# Patient Record
Sex: FEMALE | Race: WHITE | Employment: FULL TIME | URBAN - METROPOLITAN AREA
[De-identification: names, ages, dates, MRNs, and addresses within clinical notes are randomized per-mention and may not be internally consistent; named-entity substitution may affect disease eponyms.]

---

## 2018-05-24 ENCOUNTER — OFFICE VISIT (OUTPATIENT)
Dept: INTERNAL MEDICINE CLINIC | Age: 48
End: 2018-05-24

## 2018-05-24 VITALS
DIASTOLIC BLOOD PRESSURE: 84 MMHG | RESPIRATION RATE: 18 BRPM | OXYGEN SATURATION: 99 % | SYSTOLIC BLOOD PRESSURE: 125 MMHG | BODY MASS INDEX: 49.24 KG/M2 | WEIGHT: 260.8 LBS | HEART RATE: 58 BPM | TEMPERATURE: 97.8 F | HEIGHT: 61 IN

## 2018-05-24 DIAGNOSIS — Z76.89 ESTABLISHING CARE WITH NEW DOCTOR, ENCOUNTER FOR: ICD-10-CM

## 2018-05-24 DIAGNOSIS — Z12.39 SCREENING FOR MALIGNANT NEOPLASM OF BREAST: ICD-10-CM

## 2018-05-24 DIAGNOSIS — E66.01 MORBID OBESITY (HCC): ICD-10-CM

## 2018-05-24 DIAGNOSIS — N92.6 IRREGULAR PERIODS: Primary | ICD-10-CM

## 2018-05-24 NOTE — MR AVS SNAPSHOT
[address] [facility] 
[phone] Patient: Janelle Staples MRN: [id number] SHAGGY:[id number] Visit Information Date & Time Provider Department Dept. Phone Encounter #  
 5/24/2018 10:00 AM Junaid [facility] [address] [phone] [id number] Follow-up Instructions Return in about 3 months (around 8/24/2018) for pap smear . Upcoming Health Maintenance Date Due DTaP/Tdap/Td series (1 - Tdap) 9/8/1990 PAP AKA CERVICAL CYTOLOGY 9/8/1990 Influenza Age 5 to Adult 8/1/2018 Allergies as of 5/24/2018  Review Complete On: 5/24/2018 By: MD Junaid  
 Not on File Current Immunizations  Never Reviewed No immunizations on file. Not reviewed this visit You Were Diagnosed With   
  
 Codes Comments Screening for malignant neoplasm of breast    -  Primary ICD-10-CM: Z12.31 
ICD-9-CM: V76.10 Morbid obesity ([facility] [address]     ICD-10-CM: E66.01 
ICD-9-CM: 278.01 Irregular periods     ICD-10-CM: N92.6 ICD-9-CM: 626.4 Vitals BP Pulse Temp Resp Height(growth percentile) Weight(growth percentile) 125/84 (BP 1 Location: Right arm, BP Patient Position: Sitting) (!) 58 97.8 °F (36.6 °C) (Oral) 18 5' 1\" (1.549 m) 260 lb 12.8 oz (118.3 kg) SpO2 BMI OB Status Smoking Status 99% 49.28 kg/m2 Unknown Never Smoker Vitals History BMI and BSA Data Body Mass Index Body Surface Area  
 49.28 kg/m 2 2.26 m 2 Preferred Pharmacy Pharmacy Name Phone [address]  [place] [phone] Your Updated Medication List  
  
Notice  As of 5/24/2018 10:44 AM  
 You have not been prescribed any medications. We Performed the Following CBC WITH AUTOMATED DIFF [80369 CPT(R)] [facility] AND LH [78877 CPT(R)] HEMOGLOBIN A1C WITH EAG [12829 CPT(R)] LIPID PANEL [82161 CPT(R)] METABOLIC PANEL, COMPREHENSIVE [26689 CPT(R)] Follow-up Instructions Return in about 3 months (around 8/24/2018) for pap smear . To-Do List   
 05/24/2018 Imaging:  LISETH MAMMO BI SCREENING INCL CAD Patient Instructions Schedule pap smear We are checking labs Keep up the good work with diet and exercise Introducing [facility]! [facility] introduces [employer] patient portal. Now you can access parts of your medical record, email your doctor's office, and request medication refills online. 1. In your internet browser, go to [email or web address] 2. Click on the First Time User? Click Here link in the Sign In box. You will see the New Member Sign Up page. 3. Enter your Douban Access Code exactly as it appears below. You will not need to use this code after youve completed the sign-up process. If you do not sign up before the expiration date, you must request a new code. · [employer]ban Access Code: [email or web address] Expires: 8/22/2018 10:44 AM 
 
4. Enter the last four digits of your Social Security Number (xxxx) and Date of Birth (mm/dd/yyyy) as indicated and click Submit. You will be taken to the next sign-up page. 5. Create a Douban ID. This will be your Douban login ID and cannot be changed, so think of one that is secure and easy to remember. 6. Create a [employer]ban password. You can change your password at any time. 7. Enter your Password Reset Question and Answer. This can be used at a later time if you forget your password. 8. Enter your e-mail address. You will receive e-mail notification when new information is available in [address] 9. Click Sign Up. You can now view and download portions of your medical record. 10. Click the Download Summary menu link to download a portable copy of your medical information. If you have questions, please visit the Frequently Asked Questions section of the [employer] website. Remember, [employer] is NOT to be used for urgent needs. For medical emergencies, dial 911. Now available from your iPhone and Android! Please provide this summary of care documentation to your next provider. Your primary care clinician is listed as VALERIY Yan. If you have any questions after today's visit, please call [phone].

## 2018-05-24 NOTE — PROGRESS NOTES
Ms. Gerson Baird is a new patient who is here to establish care. CC:  Establish Care and Menstrual Problem (stopped about a year ago)       HPI:  Presenting to establish care. In the last year patient has had 2 periods. Last menstrual cycle was in January 2018. Menarche 5   Both sister and mother had hysterectomy  Occasional hot flashes, not bothersome  Generally feels well   Last pap smear done cannot recall   Not sexually active     Morbid obesity: Lost 80 lbs on weight watchers      SH  Works from home  Denies ETOH, tobacco and drug use    Overdue for mammogram    Review of systems:  Constitutional: negative for fever, chills, weight loss, night sweats   Eyes : negative for vision changes, eye pain and discharge  Nose and Throat: negative for tinnitus, sore throat   Cardiovascular: negative for chest pain, palpitations and shortness of breath  Respiratory: negative for shortness of breath, cough and wheezing   Gastroinstestinal: negative for abdominal pain, nausea, vomiting, diarrhea, constipation, and blood in the stool  Musculoskeletal: negative for back ache and joint ache   Genitourinary: negative for dysuria, nocturia, polyuria and hematuria   Neurologic: Negative for focal weakness, numbness or incoordination  Skin: negative for rash, pruritus  Hematologic: negative for easy bruising      History reviewed. No pertinent past medical history. Past Surgical History:   Procedure Laterality Date    HX OTHER SURGICAL      right foot surgery       Not on File    No current outpatient prescriptions on file prior to visit. No current facility-administered medications on file prior to visit. family history includes Alzheimer in her paternal grandmother; Cancer (age of onset: 62) in her father. Social History     Social History    Marital status: SINGLE     Spouse name: N/A    Number of children: N/A    Years of education: N/A     Occupational History    Not on file.      Social History Main Topics    Smoking status: Never Smoker    Smokeless tobacco: Never Used    Alcohol use Yes    Drug use: No    Sexual activity: Not Currently     Other Topics Concern    Not on file     Social History Narrative    No narrative on file       Visit Vitals    /84 (BP 1 Location: Right arm, BP Patient Position: Sitting)    Pulse (!) 58    Temp 97.8 °F (36.6 °C) (Oral)    Resp 18    Ht 5' 1\" (1.549 m)    Wt 260 lb 12.8 oz (118.3 kg)    SpO2 99%    BMI 49.28 kg/m2     General:  Well appearing female no acute distress/ morbid obesity  HEENT:   PERRL,normal conjunctiva. External ear and canals normal, TMs normal.  Hearing normal to voice. Nose without edema or discharge, normal septum. Lips, teeth, gums normal.  Oropharynx: no erythema, no exudates, no lesions, normal tongue. Neck:  Supple. Thyroid normal size, nontender, without nodules. No carotid bruit. No masses or lymphadenopathy  Respiratory: no respiratory distress,  no wheezing, no rhonchi, no rales. No chest wall tenderness. Cardiovascular:  RRR, normal S1S2, no murmur. Gastrointestinal: normal bowel sounds, soft, nontender, without masses. No hepatosplenomegaly. Extremities +2 pulses, no edema, normal sensation   Musculoskeletal:  Normal gait. Normal digits and nails. Normal strength and tone, no atrophy, and no abnormal movement. Skin:  No rash, no lesions, no ulcers. Skin warm, normal turgor, without induration or nodules. Neuro:  A and OX4, fluent speech, cranial nerves normal 2-12. Sensation normal to light touch. DTR symmetrical  Psych:  Normal affect                Assessment and Plan:   48 yo presenting to establish care  1. Screening for malignant neoplasm of breast  - LISETH MAMMOGRAM SCREENING DIGITAL BILAT; Future    2. Morbid obesity ([address]  Patient lost 80 lbs on weight watchers, congratulated and encouraged patient. She is also exercising daily.  We will check cholesterol and test for diabetes   - METABOLIC PANEL, COMPREHENSIVE  - CBC WITH AUTOMATED DIFF  - HEMOGLOBIN A1C WITH EAG  - LIPID PANEL    3. Irregular periods  Having 2 periods per year will check [address] and LH suspect approaching menopause  - [address] AND LH  - schedule pap smear      Follow-up Disposition:  Return in about 3 months (around 8/24/2018) for pap smear .      Tracey Duarte MD

## 2018-05-24 NOTE — PROGRESS NOTES
Reviewed record in preparation for visit and have obtained necessary documentation. Identified pt with two pt identifiers(name and ). Chief Complaint   Patient presents with    Establish Care    Menstrual Problem     stopped about a year ago       Health Maintenance Due   Topic Date Due    DTaP/Tdap/Td  (1 - Tdap) 1990    Cervical Cancer Screening  1990       Ms. Beaver has a reminder for a \"due or due soon\" health maintenance. I have asked that she discuss this further with her primary care provider for follow-up on this health maintenance. Coordination of Care Questionnaire:  :     1) Have you been to an emergency room, urgent care clinic since your last visit? no   Hospitalized since your last visit? no             2) Have you seen or consulted any other health care providers outside of [address] [place] [address] since your last visit? no  (Include any pap smears or colon screenings in this section.)    3) In the event something were to happen to you and you were unable to speak on your behalf, do you have an Advance Directive/ Living Will in place stating your wishes? NO    Do you have an Advance Directive on file? no    4) Are you interested in receiving information on Advance Directives? NO    Patient is accompanied by self I have received verbal consent from [address] to discuss any/all medical information while they are present in the room.

## 2018-05-25 LAB
ALBUMIN SERPL-MCNC: 4.3 G/DL (ref 3.5–5.5)
ALBUMIN/GLOB SERPL: 1.7 {RATIO} (ref 1.2–2.2)
ALP SERPL-CCNC: 99 IU/L (ref 39–117)
ALT SERPL-CCNC: 26 IU/L (ref 0–32)
AST SERPL-CCNC: 21 IU/L (ref 0–40)
BASOPHILS # BLD AUTO: 0 X10E3/UL (ref 0–0.2)
BASOPHILS NFR BLD AUTO: 0 %
BILIRUB SERPL-MCNC: 0.5 MG/DL (ref 0–1.2)
BUN SERPL-MCNC: 10 MG/DL (ref 6–24)
BUN/CREAT SERPL: 19 (ref 9–23)
CALCIUM SERPL-MCNC: 9.7 MG/DL (ref 8.7–10.2)
CHLORIDE SERPL-SCNC: 103 MMOL/L (ref 96–106)
CHOLEST SERPL-MCNC: 183 MG/DL (ref 100–199)
CO2 SERPL-SCNC: 27 MMOL/L (ref 18–29)
CREAT SERPL-MCNC: 0.54 MG/DL (ref 0.57–1)
EOSINOPHIL # BLD AUTO: 0.2 X10E3/UL (ref 0–0.4)
EOSINOPHIL NFR BLD AUTO: 3 %
ERYTHROCYTE [DISTWIDTH] IN BLOOD BY AUTOMATED COUNT: 13.3 % (ref 12.3–15.4)
EST. AVERAGE GLUCOSE BLD GHB EST-MCNC: 100 MG/DL
FSH SERPL-ACNC: 66.4 MIU/ML
GFR SERPLBLD CREATININE-BSD FMLA CKD-EPI: 112 ML/MIN/1.73
GFR SERPLBLD CREATININE-BSD FMLA CKD-EPI: 129 ML/MIN/1.73
GLOBULIN SER CALC-MCNC: 2.5 G/DL (ref 1.5–4.5)
GLUCOSE SERPL-MCNC: 87 MG/DL (ref 65–99)
HBA1C MFR BLD: 5.1 % (ref 4.8–5.6)
HCT VFR BLD AUTO: 41.2 % (ref 34–46.6)
HDLC SERPL-MCNC: 49 MG/DL
HGB BLD-MCNC: 13.4 G/DL (ref 11.1–15.9)
IMM GRANULOCYTES # BLD: 0 X10E3/UL (ref 0–0.1)
IMM GRANULOCYTES NFR BLD: 0 %
LDLC SERPL CALC-MCNC: 119 MG/DL (ref 0–99)
LH SERPL-ACNC: 45.4 MIU/ML
LYMPHOCYTES # BLD AUTO: 1.8 X10E3/UL (ref 0.7–3.1)
LYMPHOCYTES NFR BLD AUTO: 31 %
MCH RBC QN AUTO: 28.8 PG (ref 26.6–33)
MCHC RBC AUTO-ENTMCNC: 32.5 G/DL (ref 31.5–35.7)
MCV RBC AUTO: 89 FL (ref 79–97)
MONOCYTES # BLD AUTO: 0.5 X10E3/UL (ref 0.1–0.9)
MONOCYTES NFR BLD AUTO: 8 %
NEUTROPHILS # BLD AUTO: 3.4 X10E3/UL (ref 1.4–7)
NEUTROPHILS NFR BLD AUTO: 58 %
PLATELET # BLD AUTO: 191 X10E3/UL (ref 150–379)
POTASSIUM SERPL-SCNC: 5.3 MMOL/L (ref 3.5–5.2)
PROT SERPL-MCNC: 6.8 G/DL (ref 6–8.5)
RBC # BLD AUTO: 4.65 X10E6/UL (ref 3.77–5.28)
SODIUM SERPL-SCNC: 144 MMOL/L (ref 134–144)
TRIGL SERPL-MCNC: 75 MG/DL (ref 0–149)
VLDLC SERPL CALC-MCNC: 15 MG/DL (ref 5–40)
WBC # BLD AUTO: 5.9 X10E3/UL (ref 3.4–10.8)

## 2018-06-05 NOTE — PROGRESS NOTES
Please let patient know that her hormone levels are indicative of approaching menopause. She should schedule an appointment for a Pap smear    Testing for diabetes was negative. Blood count is normal  Cholesterol: Triglycerides are normal ( short term fat storage), HDL good cholesterol is at goal,  LDL which is bad cholesterol is mildly elevated. Recommend  exercise to 30 minutes daily and increased fiber intake - vegetables, fruits and oats and whole grain. Decreasing fatty food intake. We will repeat cholesterol level in one year.

## 2018-07-10 ENCOUNTER — HOSPITAL ENCOUNTER (OUTPATIENT)
Dept: MAMMOGRAPHY | Age: 48
Discharge: HOME | End: 2018-07-10
Attending: INTERNAL MEDICINE
Payer: COMMERCIAL

## 2018-07-10 DIAGNOSIS — Z12.39 SCREENING FOR MALIGNANT NEOPLASM OF BREAST: ICD-10-CM

## 2018-07-10 PROCEDURE — 77067 SCR MAMMO BI INCL CAD: CPT

## 2018-10-23 ENCOUNTER — OFFICE VISIT (OUTPATIENT)
Dept: INTERNAL MEDICINE CLINIC | Age: 48
End: 2018-10-23

## 2018-10-23 VITALS
OXYGEN SATURATION: 99 % | SYSTOLIC BLOOD PRESSURE: 135 MMHG | HEART RATE: 55 BPM | TEMPERATURE: 97.8 F | BODY MASS INDEX: 44.37 KG/M2 | HEIGHT: 61 IN | RESPIRATION RATE: 18 BRPM | WEIGHT: 235 LBS | DIASTOLIC BLOOD PRESSURE: 88 MMHG

## 2018-10-23 DIAGNOSIS — J06.9 VIRAL UPPER RESPIRATORY TRACT INFECTION: Primary | ICD-10-CM

## 2018-10-23 NOTE — PATIENT INSTRUCTIONS
Viral Respiratory Infection: Care Instructions  Your Care Instructions    Viruses are very small organisms. They grow in number after they enter your body. There are many types that cause different illnesses, such as colds and the mumps. The symptoms of a viral respiratory infection often start quickly. They include a fever, sore throat, and runny nose. You may also just not feel well. Or you may not want to eat much. Most viral respiratory infections are not serious. They usually get better with time and self-care. Antibiotics are not used to treat a viral infection. That's because antibiotics will not help cure a viral illness. In some cases, antiviral medicine can help your body fight a serious viral infection. Follow-up care is a key part of your treatment and safety. Be sure to make and go to all appointments, and call your doctor if you are having problems. It's also a good idea to know your test results and keep a list of the medicines you take. How can you care for yourself at home? · Rest as much as possible until you feel better. · Be safe with medicines. Take your medicine exactly as prescribed. Call your doctor if you think you are having a problem with your medicine. You will get more details on the specific medicine your doctor prescribes. · Take an over-the-counter pain medicine, such as acetaminophen (Tylenol), ibuprofen (Advil, Motrin), or naproxen (Aleve), as needed for pain and fever. Read and follow all instructions on the label. Do not give aspirin to anyone younger than 20. It has been linked to Reye syndrome, a serious illness. · Drink plenty of fluids, enough so that your urine is light yellow or clear like water. Hot fluids, such as tea or soup, may help relieve congestion in your nose and throat. If you have kidney, heart, or liver disease and have to limit fluids, talk with your doctor before you increase the amount of fluids you drink.   · Try to clear mucus from your lungs by breathing deeply and coughing. · Gargle with warm salt water once an hour. This can help reduce swelling and throat pain. Use 1 teaspoon of salt mixed in 1 cup of warm water. · Do not smoke or allow others to smoke around you. If you need help quitting, talk to your doctor about stop-smoking programs and medicines. These can increase your chances of quitting for good. To avoid spreading the virus  · Cough or sneeze into a tissue. Then throw the tissue away. · If you don't have a tissue, use your hand to cover your cough or sneeze. Then clean your hand. You can also cough into your sleeve. · Wash your hands often. Use soap and warm water. Wash for 15 to 20 seconds each time. · If you don't have soap and water near you, you can clean your hands with alcohol wipes or gel. When should you call for help? Call your doctor now or seek immediate medical care if:    · You have a new or higher fever.     · Your fever lasts more than 48 hours.     · You have trouble breathing.     · You have a fever with a stiff neck or a severe headache.     · You are sensitive to light.     · You feel very sleepy or confused.    Watch closely for changes in your health, and be sure to contact your doctor if:    · You do not get better as expected. Where can you learn more? Go to http://kassidy-millicent.info/. Enter C455 in the search box to learn more about \"Viral Respiratory Infection: Care Instructions. \"  Current as of: December 6, 2017  Content Version: 11.8  © 2088-4866 [employer]. Care instructions adapted under license by [employer] (which disclaims liability or warranty for this information). If you have questions about a medical condition or this instruction, always ask your healthcare professional. [address] any warranty or liability for your use of this information.

## 2018-10-23 NOTE — PROGRESS NOTES
Reviewed record in preparation for visit and have obtained necessary documentation. Identified pt with two pt identifiers(name and ). Chief Complaint   Patient presents with    Cold Symptoms       Health Maintenance Due   Topic Date Due    DTaP/Tdap/Td  (1 - Tdap) 1990    Cervical Cancer Screening  1990    Flu Vaccine  2018       Ms. Beaver has a reminder for a \"due or due soon\" health maintenance. I have asked that she discuss this further with her primary care provider for follow-up on this health maintenance. Coordination of Care Questionnaire:  :     1) Have you been to an emergency room, urgent care clinic since your last visit? no   Hospitalized since your last visit? no             2) Have you seen or consulted any other health care providers outside of [address] [place] [address] since your last visit? no  (Include any pap smears or colon screenings in this section.)    3) In the event something were to happen to you and you were unable to speak on your behalf, do you have an Advance Directive/ Living Will in place stating your wishes? NO    Do you have an Advance Directive on file? no    4) Are you interested in receiving information on Advance Directives? NO    Patient is accompanied by self I have received verbal consent from [address] to discuss any/all medical information while they are present in the room.

## 2018-10-23 NOTE — PROGRESS NOTES
CC:   Chief Complaint   Patient presents with    Cold Symptoms         HPI:    Michael Whitley is a 51 y.o. female who complains of URI symptoms for 6  days. The patient reports sore throat, nasal congestion, ear congestion and cough. Feels exhausted  Resting   Coughing frequently using cough drops  Feels like the chest is congested     Review of Systems    Constitutional: negative for fevers, chills, anorexia and weight loss  Eyes:   negative for visual disturbance and irritation  ENT:   negative for tinnitus, positive for sore throat and nasal congestion  Negative for ear pain   Respiratory:  Positive  for cough, negative for hemoptysis, dyspnea,wheezing  CV:   negative for chest pain, palpitations, lower extremity edema  GI:   negative for nausea, vomiting, diarrhea, abdominal pain,melena  Genitourinary: negative for frequency, dysuria and hematuria, vaginal discharge, lesions  Musculoskel: negative for myalgias, arthralgias, back pain, muscle weakness, joint pain  Neurological:  negative for headaches, dizziness, focal weakness, numbness  Psych:         : negative for depression, anxiety     Objective:     Visit Vitals  /88 (BP 1 Location: Right arm, BP Patient Position: Sitting)   Pulse (!) 55   Temp 97.8 °F (36.6 °C) (Oral)   Resp 18   Ht 5' 1\" (1.549 m)   Wt 235 lb (106.6 kg)   SpO2 99%   BMI 44.40 kg/m²     Gen: Mildly ill appearing female  HEENT:   PERRL,normal conjunctiva. External ear and canals normal, TMs no opacification or erythema,  Swollen nasal turbinates, no sinus pain on palpation,  OP no erythema, no exudates, MMM  Neck:  Supple. Thyroid normal size, nontender, without nodules. No masses or LAD  Resp:  No wheezing, no rhonchi, no rales. CV:  RRR, normal S1S2, no murmur. GI: soft, nontender, without masses. No hepatosplenomegaly. Extrem:  +2 pulses, no edema, warm distally  Skin:  No rash, no lesions, no ulcers. Skin warm, normal turgor, without induration or nodules.        Lab Results   Component Value Date/Time    WBC 5.9 05/24/2018 11:16 AM    HGB 13.4 05/24/2018 11:16 AM    HCT 41.2 05/24/2018 11:16 AM    PLATELET 062 72/76/3930 11:16 AM    MCV 89 05/24/2018 11:16 AM     Lab Results   Component Value Date/Time    Sodium 144 05/24/2018 11:16 AM    Potassium 5.3 (H) 05/24/2018 11:16 AM    Chloride 103 05/24/2018 11:16 AM    CO2 27 05/24/2018 11:16 AM    Glucose 87 05/24/2018 11:16 AM    BUN 10 05/24/2018 11:16 AM    Creatinine 0.54 (L) 05/24/2018 11:16 AM    BUN/Creatinine ratio 19 05/24/2018 11:16 AM    GFR est  05/24/2018 11:16 AM    GFR est non- 05/24/2018 11:16 AM    Calcium 9.7 05/24/2018 11:16 AM         Assessment/Plan:     1. Viral upper respiratory tract infection  Rest, drink fluids   Call if symptoms do not improve in the next 5 days   No role for antibiotics        Follow-up Disposition:  Return if symptoms worsen or fail to improve.     Art MD Sam

## 2019-05-30 ENCOUNTER — OFFICE VISIT (OUTPATIENT)
Dept: INTERNAL MEDICINE CLINIC | Age: 49
End: 2019-05-30

## 2019-05-30 VITALS
RESPIRATION RATE: 18 BRPM | DIASTOLIC BLOOD PRESSURE: 73 MMHG | SYSTOLIC BLOOD PRESSURE: 114 MMHG | OXYGEN SATURATION: 99 % | TEMPERATURE: 97.8 F | WEIGHT: 217 LBS | HEIGHT: 61 IN | BODY MASS INDEX: 40.97 KG/M2 | HEART RATE: 63 BPM

## 2019-05-30 DIAGNOSIS — E66.01 MORBID OBESITY (HCC): Primary | ICD-10-CM

## 2019-05-30 DIAGNOSIS — M17.10 ARTHRITIS OF KNEE: ICD-10-CM

## 2019-05-30 RX ORDER — PHENTERMINE HYDROCHLORIDE 15 MG/1
15 CAPSULE ORAL
Qty: 30 CAP | Refills: 0 | Status: SHIPPED | OUTPATIENT
Start: 2019-05-30 | End: 2019-07-02 | Stop reason: ALTCHOICE

## 2019-05-30 NOTE — PROGRESS NOTES
CC: Complete Physical      HPI:    She is a 51 y.o. female who presents for evaluation of weight loss  Patient has steadily loosing weight. Doing weight watchers. Started 340 lbs and currently at 217 lbs  Has not taken any medication for weight loss  Goal 150 lbs  Since February plateau and difficulty with weight loss  Working just as hard, Using the fitness pal, Measures carb to fat to protein     ROS:  Constitutional: negative for fevers, chills, anorexia and weight loss  Eyes:   negative for visual disturbance,  irritation  ENT:   negative for tinnitus,sore throat,nasal congestion,ear pain, sinus pain. Respiratory:  negative for cough, hemoptysis, dyspnea,wheezing  CV:   negative for chest pain, palpitations, lower extremity edema  GI:   negative for nausea, vomiting, diarrhea, abdominal pain,melena  Genitourinary: negative for frequency, dysuria, hematuria  Musculoskel:positive for knee pain billateral chronic, exacerbated by running  Neurological:  negative for headaches, dizziness, focal weakness, numbness  Psych:             Negative for depression and anxiety    History reviewed. No pertinent past medical history. No current outpatient medications on file prior to visit. No current facility-administered medications on file prior to visit.         Past Surgical History:   Procedure Laterality Date    HX OTHER SURGICAL      right foot surgery       Family History   Problem Relation Age of Onset    Cancer Father 62        agent orange    Alzheimer Paternal Grandmother      Reviewed and no changes     Social History     Socioeconomic History    Marital status: SINGLE     Spouse name: Not on file    Number of children: Not on file    Years of education: Not on file    Highest education level: Not on file   Occupational History    Not on file   Social Needs    Financial resource strain: Not on file    Food insecurity:     Worry: Not on file     Inability: Not on file    Transportation needs: Medical: Not on file     Non-medical: Not on file   Tobacco Use    Smoking status: Never Smoker    Smokeless tobacco: Never Used   Substance and Sexual Activity    Alcohol use:  Yes    Drug use: No    Sexual activity: Not Currently   Lifestyle    Physical activity:     Days per week: Not on file     Minutes per session: Not on file    Stress: Not on file   Relationships    Social connections:     Talks on phone: Not on file     Gets together: Not on file     Attends Church service: Not on file     Active member of club or organization: Not on file     Attends meetings of clubs or organizations: Not on file     Relationship status: Not on file    Intimate partner violence:     Fear of current or ex partner: Not on file     Emotionally abused: Not on file     Physically abused: Not on file     Forced sexual activity: Not on file   Other Topics Concern    Not on file   Social History Narrative    Not on file            Visit Vitals  /73 (BP 1 Location: Right arm, BP Patient Position: Sitting)   Pulse 63   Temp 97.8 °F (36.6 °C) (Oral)   Resp 18   Ht 5' 1\" (1.549 m)   Wt 217 lb (98.4 kg)   SpO2 99%   BMI 41.00 kg/m²       Physical Examination:   General - Well appearing female  HEENT - PERRL, TM no erythema/opacification, normal nasal turbinates, oropharynx no erythema or exudate, MMM  Neck - supple, no bruits, no TMG, no LAD  Pulm - clear to auscultation bilaterally  Cardio - RRR, normal S1 S2, no murmur gallops or rubs  Abd - soft, nontender, no masses, no HSM  Extrem - no edema, +2 distal pulses  Psych - normal affect, appropriate mood    Lab Results   Component Value Date/Time    WBC 5.9 05/24/2018 11:16 AM    HGB 13.4 05/24/2018 11:16 AM    HCT 41.2 05/24/2018 11:16 AM    PLATELET 099 69/58/2019 11:16 AM    MCV 89 05/24/2018 11:16 AM     Lab Results   Component Value Date/Time    Sodium 144 05/24/2018 11:16 AM    Potassium 5.3 (H) 05/24/2018 11:16 AM    Chloride 103 05/24/2018 11:16 AM    CO2 27 05/24/2018 11:16 AM    Glucose 87 05/24/2018 11:16 AM    BUN 10 05/24/2018 11:16 AM    Creatinine 0.54 (L) 05/24/2018 11:16 AM    BUN/Creatinine ratio 19 05/24/2018 11:16 AM    GFR est  05/24/2018 11:16 AM    GFR est non- 05/24/2018 11:16 AM    Calcium 9.7 05/24/2018 11:16 AM     Lab Results   Component Value Date/Time    Cholesterol, total 183 05/24/2018 11:16 AM    HDL Cholesterol 49 05/24/2018 11:16 AM    LDL, calculated 119 (H) 05/24/2018 11:16 AM    VLDL, calculated 15 05/24/2018 11:16 AM    Triglyceride 75 05/24/2018 11:16 AM     No results found for: TSH, TSH2, TSH3, TSHP, TSHEXT, TSHEXT  No results found for: PSA, Ethelda Peaks, [id number], [id number], PSALT  Lab Results   Component Value Date/Time    Hemoglobin A1c 5.1 05/24/2018 11:16 AM     No results found for: Sudie Can, VD3RIA    Lab Results   Component Value Date/Time    ALT (SGPT) 26 05/24/2018 11:16 AM    AST (SGOT) 21 05/24/2018 11:16 AM    Alk. phosphatase 99 05/24/2018 11:16 AM    Bilirubin, total 0.5 05/24/2018 11:16 AM           Assessment/Plan:    1. Morbid obesity ([address]- encouraged patient to continue with weight loss - she has lost 125 lbs in 2 years and now having difficulty loosing more weight. Discussed risks of phentermine including arrhythmia   - phentermine (ADIPEX_P) 15 mg capsule; Take 1 Cap by mouth every morning. Max Daily Amount: 15 mg. Dispense: 30 Cap; Refill: 0  - METABOLIC PANEL, COMPREHENSIVE  - CBC WITH AUTOMATED DIFF    2.  Arthritis of knee: continue with weight loss, avoid high impact exercise  - METABOLIC PANEL, COMPREHENSIVE  - CBC WITH AUTOMATED DIFF            Raquel Wynonia Spurling, MD

## 2019-05-30 NOTE — PROGRESS NOTES
Reviewed record in preparation for visit and have obtained necessary documentation. Identified pt with two pt identifiers(name and ). Chief Complaint   Patient presents with    Complete Physical       Health Maintenance Due   Topic Date Due    DTaP/Tdap/Td  (1 - Tdap) 1990    Pap Test  1990       Ms. Beaver has a reminder for a \"due or due soon\" health maintenance. I have asked that she discuss this further with her primary care provider for follow-up on this health maintenance. Coordination of Care Questionnaire:  :     1) Have you been to an emergency room, urgent care clinic since your last visit? no   Hospitalized since your last visit? no             2) Have you seen or consulted any other health care providers outside of [address] [place] [address] since your last visit? no  (Include any pap smears or colon screenings in this section.)    3) In the event something were to happen to you and you were unable to speak on your behalf, do you have an Advance Directive/ Living Will in place stating your wishes? NO    Do you have an Advance Directive on file? no    4) Are you interested in receiving information on Advance Directives? NO    Patient is accompanied by self I have received verbal consent from [address] to discuss any/all medical information while they are present in the room.

## 2019-05-31 LAB
ALBUMIN SERPL-MCNC: 4.4 G/DL (ref 3.5–5.5)
ALBUMIN/GLOB SERPL: 1.8 {RATIO} (ref 1.2–2.2)
ALP SERPL-CCNC: 79 IU/L (ref 39–117)
ALT SERPL-CCNC: 21 IU/L (ref 0–32)
AST SERPL-CCNC: 22 IU/L (ref 0–40)
BASOPHILS # BLD AUTO: 0 X10E3/UL (ref 0–0.2)
BASOPHILS NFR BLD AUTO: 0 %
BILIRUB SERPL-MCNC: 0.3 MG/DL (ref 0–1.2)
BUN SERPL-MCNC: 26 MG/DL (ref 6–24)
BUN/CREAT SERPL: 44 (ref 9–23)
CALCIUM SERPL-MCNC: 9.6 MG/DL (ref 8.7–10.2)
CHLORIDE SERPL-SCNC: 101 MMOL/L (ref 96–106)
CO2 SERPL-SCNC: 27 MMOL/L (ref 20–29)
CREAT SERPL-MCNC: 0.59 MG/DL (ref 0.57–1)
EOSINOPHIL # BLD AUTO: 0.1 X10E3/UL (ref 0–0.4)
EOSINOPHIL NFR BLD AUTO: 2 %
ERYTHROCYTE [DISTWIDTH] IN BLOOD BY AUTOMATED COUNT: 13.3 % (ref 12.3–15.4)
GLOBULIN SER CALC-MCNC: 2.5 G/DL (ref 1.5–4.5)
GLUCOSE SERPL-MCNC: 93 MG/DL (ref 65–99)
HCT VFR BLD AUTO: 39.3 % (ref 34–46.6)
HGB BLD-MCNC: 13.2 G/DL (ref 11.1–15.9)
IMM GRANULOCYTES # BLD AUTO: 0 X10E3/UL (ref 0–0.1)
IMM GRANULOCYTES NFR BLD AUTO: 0 %
LYMPHOCYTES # BLD AUTO: 2.2 X10E3/UL (ref 0.7–3.1)
LYMPHOCYTES NFR BLD AUTO: 34 %
MCH RBC QN AUTO: 29.5 PG (ref 26.6–33)
MCHC RBC AUTO-ENTMCNC: 33.6 G/DL (ref 31.5–35.7)
MCV RBC AUTO: 88 FL (ref 79–97)
MONOCYTES # BLD AUTO: 0.4 X10E3/UL (ref 0.1–0.9)
MONOCYTES NFR BLD AUTO: 7 %
NEUTROPHILS # BLD AUTO: 3.6 X10E3/UL (ref 1.4–7)
NEUTROPHILS NFR BLD AUTO: 57 %
PLATELET # BLD AUTO: 208 X10E3/UL (ref 150–450)
POTASSIUM SERPL-SCNC: 4.7 MMOL/L (ref 3.5–5.2)
PROT SERPL-MCNC: 6.9 G/DL (ref 6–8.5)
RBC # BLD AUTO: 4.48 X10E6/UL (ref 3.77–5.28)
SODIUM SERPL-SCNC: 141 MMOL/L (ref 134–144)
T4 FREE SERPL-MCNC: 1.3 NG/DL (ref 0.82–1.77)
TSH SERPL DL<=0.005 MIU/L-ACNC: 2.32 UIU/ML (ref 0.45–4.5)
WBC # BLD AUTO: 6.3 X10E3/UL (ref 3.4–10.8)

## 2019-07-02 ENCOUNTER — OFFICE VISIT (OUTPATIENT)
Dept: INTERNAL MEDICINE CLINIC | Age: 49
End: 2019-07-02

## 2019-07-02 ENCOUNTER — HOSPITAL ENCOUNTER (OUTPATIENT)
Dept: LAB | Age: 49
Discharge: HOME | End: 2019-07-02
Payer: COMMERCIAL

## 2019-07-02 ENCOUNTER — TELEPHONE (OUTPATIENT)
Dept: INTERNAL MEDICINE CLINIC | Age: 49
End: 2019-07-02

## 2019-07-02 VITALS
SYSTOLIC BLOOD PRESSURE: 130 MMHG | BODY MASS INDEX: 40.22 KG/M2 | HEART RATE: 51 BPM | OXYGEN SATURATION: 99 % | DIASTOLIC BLOOD PRESSURE: 78 MMHG | TEMPERATURE: 97.8 F | WEIGHT: 213 LBS | RESPIRATION RATE: 18 BRPM | HEIGHT: 61 IN

## 2019-07-02 DIAGNOSIS — E66.01 CLASS 3 SEVERE OBESITY DUE TO EXCESS CALORIES WITHOUT SERIOUS COMORBIDITY WITH BODY MASS INDEX (BMI) OF 40.0 TO 44.9 IN ADULT (HCC): ICD-10-CM

## 2019-07-02 DIAGNOSIS — Z12.31 BREAST CANCER SCREENING BY MAMMOGRAM: Primary | ICD-10-CM

## 2019-07-02 DIAGNOSIS — Z01.419 ENCOUNTER FOR WELL WOMAN EXAM: Primary | ICD-10-CM

## 2019-07-02 PROCEDURE — 87624 HPV HI-RISK TYP POOLED RSLT: CPT

## 2019-07-02 PROCEDURE — 88175 CYTOPATH C/V AUTO FLUID REDO: CPT

## 2019-07-02 NOTE — TELEPHONE ENCOUNTER
Spoke with patient. Two pt identifiers confirmed. Advised patient that we will order her mammogram and then scheduling will contact her to setup. Pt verbalized understanding of information discussed w/ no further questions at this time.

## 2019-07-02 NOTE — PROGRESS NOTES
CC: Weight Management and Well Woman      HPI:    She is a 51 y.o. female who presents for evaluation of womans exam    Recall may 30th patient was started on low dose phentermine 15mg but felt jittery and decided to stop medication. Lost 4 lbs since last visit down to 213     Cannot recall last pap smear  Not sexually active  No surgeries  No kids  No vaginal issues    Not having periods     ROS:  Constitutional: negative for fevers, chills, anorexia and weight loss  Eyes:   negative for visual disturbance,  irritation  ENT:   negative for tinnitus,sore throat,nasal congestion,ear pain, sinus pain. Respiratory:  negative for cough, hemoptysis, dyspnea,wheezing  CV:   negative for chest pain, palpitations, lower extremity edema  GI:   negative for nausea, vomiting, diarrhea, abdominal pain,melena  Genitourinary: negative for frequency, dysuria, hematuria  Musculoskel: negative for myalgias, arthralgias, back pain, muscle weakness, joint pain  Neurological:  negative for headaches, dizziness, focal weakness, numbness  Psych:             Negative for depression and anxiety    History reviewed. No pertinent past medical history. No current outpatient medications on file prior to visit. No current facility-administered medications on file prior to visit.         Past Surgical History:   Procedure Laterality Date    HX OTHER SURGICAL      right foot surgery       Family History   Problem Relation Age of Onset    Cancer Father 62        agent orange    Alzheimer Paternal Grandmother      Reviewed and no changes     Social History     Socioeconomic History    Marital status: SINGLE     Spouse name: Not on file    Number of children: Not on file    Years of education: Not on file    Highest education level: Not on file   Occupational History    Not on file   Social Needs    Financial resource strain: Not on file    Food insecurity:     Worry: Not on file     Inability: Not on file   R[place]r needs: Medical: Not on file     Non-medical: Not on file   Tobacco Use    Smoking status: Never Smoker    Smokeless tobacco: Never Used   Substance and Sexual Activity    Alcohol use: Yes    Drug use: No    Sexual activity: Not Currently   Lifestyle    Physical activity:     Days per week: Not on file     Minutes per session: Not on file    Stress: Not on file   Relationships    Social connections:     Talks on phone: Not on file     Gets together: Not on file     Attends Rastafarian service: Not on file     Active member of club or organization: Not on file     Attends meetings of clubs or organizations: Not on file     Relationship status: Not on file    Intimate partner violence:     Fear of current or ex partner: Not on file     Emotionally abused: Not on file     Physically abused: Not on file     Forced sexual activity: Not on file   Other Topics Concern    Not on file   Social History Narrative    Not on file            Visit Vitals  /78 (BP 1 Location: Right arm, BP Patient Position: Sitting)   Pulse (!) 51   Temp 97.8 °F (36.6 °C) (Oral)   Resp 18   Ht 5' 1\" (1.549 m)   Wt 213 lb (96.6 kg)   SpO2 99%   BMI 40.25 kg/m²       Physical Examination:   General - Well appearing female  HEENT - PERRL, TM no erythema/opacification, normal nasal turbinates, oropharynx no erythema or exudate, MMM  Neck - supple, no bruits, no TMG, no LAD  Pulm - clear to auscultation bilaterally  Cardio - RRR, normal S1 S2, no murmur gallops or rubs  Abd - soft, nontender, no masses, no HSM  Extrem - no edema, +2 distal pulses  Psych - normal affect, appropriate mood      Pelvic- normal appearing external genitalia, speculum normal appearing cervix, no abnormal discharge, pap done. Bimanual exam, no cervical motion tenderness, no adnexal tenderness or masses.       Lab Results   Component Value Date/Time    WBC 6.3 05/30/2019 04:13 PM    HGB 13.2 05/30/2019 04:13 PM    HCT 39.3 05/30/2019 04:13 PM    PLATELET 029 05/30/2019 04:13 PM    MCV 88 05/30/2019 04:13 PM     Lab Results   Component Value Date/Time    Sodium 141 05/30/2019 04:13 PM    Potassium 4.7 05/30/2019 04:13 PM    Chloride 101 05/30/2019 04:13 PM    CO2 27 05/30/2019 04:13 PM    Glucose 93 05/30/2019 04:13 PM    BUN 26 (H) 05/30/2019 04:13 PM    Creatinine 0.59 05/30/2019 04:13 PM    BUN/Creatinine ratio 44 (H) 05/30/2019 04:13 PM    GFR est  05/30/2019 04:13 PM    GFR est non- 05/30/2019 04:13 PM    Calcium 9.6 05/30/2019 04:13 PM     Lab Results   Component Value Date/Time    Cholesterol, total 183 05/24/2018 11:16 AM    HDL Cholesterol 49 05/24/2018 11:16 AM    LDL, calculated 119 (H) 05/24/2018 11:16 AM    VLDL, calculated 15 05/24/2018 11:16 AM    Triglyceride 75 05/24/2018 11:16 AM     Lab Results   Component Value Date/Time    TSH 2.320 05/30/2019 04:13 PM     No results found for: PSA, Yvonne Mends, [id number], [id number], PSALT  Lab Results   Component Value Date/Time    Hemoglobin A1c 5.1 05/24/2018 11:16 AM     No results found for: Ramy Archer, VD3RIA    Lab Results   Component Value Date/Time    ALT (SGPT) 21 05/30/2019 04:13 PM    AST (SGOT) 22 05/30/2019 04:13 PM    Alk. phosphatase 79 05/30/2019 04:13 PM    Bilirubin, total 0.3 05/30/2019 04:13 PM           Assessment/Plan:    1. Encounter for well woman exam normal exam  - PAP IG, APTIMA HPV AND RFX 16/18,45 ([id number])    2.  Obesity: did not tolerate phentermine, loosing weight, encouraged to keep up the good work        Rosalinda Benítez MD

## 2019-07-02 NOTE — PROGRESS NOTES
Reviewed record in preparation for visit and have obtained necessary documentation. Identified pt with two pt identifiers(name and ). Chief Complaint   Patient presents with    Weight Management       Health Maintenance Due   Topic Date Due    DTaP/Tdap/Td  (1 - Tdap) 1990    Pap Test  1990       Ms. Beaver has a reminder for a \"due or due soon\" health maintenance. I have asked that she discuss this further with her primary care provider for follow-up on this health maintenance. Coordination of Care Questionnaire:  :     1) Have you been to an emergency room, urgent care clinic since your last visit? no   Hospitalized since your last visit? no             2) Have you seen or consulted any other health care providers outside of [address] [place] [address] since your last visit? no  (Include any pap smears or colon screenings in this section.)    3) In the event something were to happen to you and you were unable to speak on your behalf, do you have an Advance Directive/ Living Will in place stating your wishes? NO    Do you have an Advance Directive on file? no    4) Are you interested in receiving information on Advance Directives? NO    Patient is accompanied by self I have received verbal consent from [address] to discuss any/all medical information while they are present in the room.

## 2019-07-29 ENCOUNTER — TELEPHONE (OUTPATIENT)
Dept: INTERNAL MEDICINE CLINIC | Age: 49
End: 2019-07-29

## 2019-07-29 NOTE — TELEPHONE ENCOUNTER
----- Message from Carlota Alexander sent at 7/29/2019 10:34 AM EDT -----  Regarding: Dr. Morris Forget: [phone]  Pt. stated she is having unexpected bleeding after intercourse. Pt would like to speak to the doctor about symptoms. Best contact number [phone].       Copy/paste envera

## 2019-07-29 NOTE — TELEPHONE ENCOUNTER
I recommend a pelvic exam followed by a pelvic US.  Vaginal bleeding post menopausal needs to be evaluated

## 2019-07-29 NOTE — TELEPHONE ENCOUNTER
Spoke with patient. Two pt identifiers confirmed. Patient states that she has not had her menstrual cycle in about a year and half. Patient states that she recently became involved with a new person and after intercourse patient states that she noticed that she was having some vaginal bleeding. Patient states that this lasted about 12 hours. Patient states that it was enough that she had to wear a panty liner. Patient states that she has been spotting since then. Patient denies being in pain. Patient denies experiencing vaginal dryness. Patient advised that I will check with Dr. Petar Rousseau to see what her thoughts are and will give her a call back as soon as I can. Pt verbalized understanding of information discussed w/ no further questions at this time.

## 2019-07-30 NOTE — TELEPHONE ENCOUNTER
Taina Charles MD 16 hours ago (4:32 PM)         I recommend a pelvic exam followed by a pelvic US. Vaginal bleeding post menopausal needs to be evaluated        Spoke with patient. Two pt identifiers confirmed. Patient advised of the above message from Dr. Fabian Proctor. Patient offered an appointment with Dr. Fabian Proctor on 08/01/19. Appointment accepted. Patient advised if anything changes or if unable to keep this appointment to call the office  Pt verbalized understanding of information discussed w/ no further questions at this time.

## 2019-08-01 ENCOUNTER — OFFICE VISIT (OUTPATIENT)
Dept: INTERNAL MEDICINE CLINIC | Age: 49
End: 2019-08-01

## 2019-08-01 VITALS
TEMPERATURE: 98.5 F | HEIGHT: 61 IN | DIASTOLIC BLOOD PRESSURE: 80 MMHG | WEIGHT: 212 LBS | RESPIRATION RATE: 16 BRPM | OXYGEN SATURATION: 97 % | BODY MASS INDEX: 40.02 KG/M2 | SYSTOLIC BLOOD PRESSURE: 117 MMHG | HEART RATE: 66 BPM

## 2019-08-01 DIAGNOSIS — N95.0 POST-MENOPAUSAL BLEEDING: Primary | ICD-10-CM

## 2019-08-01 LAB
BILIRUB UR QL STRIP: NEGATIVE
GLUCOSE UR-MCNC: NEGATIVE MG/DL
KETONES P FAST UR STRIP-MCNC: NEGATIVE MG/DL
PH UR STRIP: 7 [PH] (ref 4.6–8)
PROT UR QL STRIP: NEGATIVE
SP GR UR STRIP: 1.01 (ref 1–1.03)
UA UROBILINOGEN AMB POC: NORMAL (ref 0.2–1)
URINALYSIS CLARITY POC: CLEAR
URINALYSIS COLOR POC: YELLOW
URINE BLOOD POC: NEGATIVE
URINE LEUKOCYTES POC: NEGATIVE
URINE NITRITES POC: NEGATIVE

## 2019-08-01 NOTE — PATIENT INSTRUCTIONS
There is a tear on the vaginal wall that will take 2 weeks to heal 
Requested US of the uterus to ensure normal lining and no uterine fibroid or cancer ( very low suspicion)

## 2019-08-01 NOTE — PROGRESS NOTES
CC: Post Menopausal Bleeding (Pt states it only happened once last week post intercourse. Pt denies symptoms today)      HPI:    She is a 51 y.o. female who presents for evaluation of postmenopausal bleeding   Patient had a normal pap smear July 2nd 2019   Noted vaginal bleeding after sexual intercourse \" like a period\" for 12 hours      No symptoms since then  She did not have sex after that event  No urinary symptoms    ROS:  Constitutional: negative for fevers, chills, anorexia and weight loss  Eyes:   negative for visual disturbance,  irritation  ENT:   negative for tinnitus,sore throat,nasal congestion,ear pain, sinus pain. Respiratory:  negative for cough, hemoptysis, dyspnea,wheezing  CV:   negative for chest pain, palpitations, lower extremity edema  GI:   negative for nausea, vomiting, diarrhea, abdominal pain,melena  Genitourinary: negative for frequency, dysuria, hematuria  Musculoskel: negative for myalgias, arthralgias, back pain, muscle weakness, joint pain  Neurological:  negative for headaches, dizziness, focal weakness, numbness  Psych:             Negative for depression and anxiety    History reviewed. No pertinent past medical history. No current outpatient medications on file prior to visit. No current facility-administered medications on file prior to visit.         Past Surgical History:   Procedure Laterality Date    HX OTHER SURGICAL      right foot surgery       Family History   Problem Relation Age of Onset    Cancer Father 62        agent orange    Alzheimer Paternal Grandmother      Reviewed and no changes     Social History     Socioeconomic History    Marital status: SINGLE     Spouse name: Not on file    Number of children: Not on file    Years of education: Not on file    Highest education level: Not on file   Occupational History    Not on file   Social Needs    Financial resource strain: Not on file    Food insecurity:     Worry: Not on file     Inability: Not on file   Mobi Rider needs:     Medical: Not on file     Non-medical: Not on file   Tobacco Use    Smoking status: Never Smoker    Smokeless tobacco: Never Used   Substance and Sexual Activity    Alcohol use:  Yes    Drug use: No    Sexual activity: Not Currently   Lifestyle    Physical activity:     Days per week: Not on file     Minutes per session: Not on file    Stress: Not on file   Relationships    Social connections:     Talks on phone: Not on file     Gets together: Not on file     Attends Restorationist service: Not on file     Active member of club or organization: Not on file     Attends meetings of clubs or organizations: Not on file     Relationship status: Not on file    Intimate partner violence:     Fear of current or ex partner: Not on file     Emotionally abused: Not on file     Physically abused: Not on file     Forced sexual activity: Not on file   Other Topics Concern    Not on file   Social History Narrative    Not on file            Visit Vitals  /80 (BP 1 Location: Right arm, BP Patient Position: Lying left side)   Pulse 66   Temp 98.5 °F (36.9 °C) (Oral)   Resp 16   Ht 5' 1\" (1.549 m)   Wt 212 lb (96.2 kg)   SpO2 97%   BMI 40.06 kg/m²       Physical Examination:   General - Well appearing female  HEENT - PERRL, TM no erythema/opacification, normal nasal turbinates, oropharynx no erythema or exudate, MMM  Neck - supple, no bruits, no TMG, no LAD  Pulm - clear to auscultation bilaterally  Cardio - RRR, normal S1 S2, no murmur gallops or rubs  Abd - soft, nontender, no masses, no HSM  Extrem - no edema, +2 distal pulses  Psych - normal affect, appropriate mood    Pelvic- normal appearing external genitalia, speculum normal appearing cervix, on upper vaginal wall there is a tear that is healing, some old blood noted    Lab Results   Component Value Date/Time    WBC 6.3 05/30/2019 04:13 PM    HGB 13.2 05/30/2019 04:13 PM    HCT 39.3 05/30/2019 04:13 PM    PLATELET 065 75/72/5732 04:13 PM    MCV 88 05/30/2019 04:13 PM     Lab Results   Component Value Date/Time    Sodium 141 05/30/2019 04:13 PM    Potassium 4.7 05/30/2019 04:13 PM    Chloride 101 05/30/2019 04:13 PM    CO2 27 05/30/2019 04:13 PM    Glucose 93 05/30/2019 04:13 PM    BUN 26 (H) 05/30/2019 04:13 PM    Creatinine 0.59 05/30/2019 04:13 PM    BUN/Creatinine ratio 44 (H) 05/30/2019 04:13 PM    GFR est  05/30/2019 04:13 PM    GFR est non- 05/30/2019 04:13 PM    Calcium 9.6 05/30/2019 04:13 PM     Lab Results   Component Value Date/Time    Cholesterol, total 183 05/24/2018 11:16 AM    HDL Cholesterol 49 05/24/2018 11:16 AM    LDL, calculated 119 (H) 05/24/2018 11:16 AM    VLDL, calculated 15 05/24/2018 11:16 AM    Triglyceride 75 05/24/2018 11:16 AM     Lab Results   Component Value Date/Time    TSH 2.320 05/30/2019 04:13 PM     No results found for: PSA, Cayla Ramires, [id number], [id number], PSALT  Lab Results   Component Value Date/Time    Hemoglobin A1c 5.1 05/24/2018 11:16 AM     No results found for: Nancie Olivia, VD3RIA    Lab Results   Component Value Date/Time    ALT (SGPT) 21 05/30/2019 04:13 PM    AST (SGOT) 22 05/30/2019 04:13 PM    Alk. phosphatase 79 05/30/2019 04:13 PM    Bilirubin, total 0.3 05/30/2019 04:13 PM           Assessment/Plan:    1.  Post-menopausal bleeding  There is a small tear on the vaginal wall that is the likely culprit however Requested US of the uterus to ensure normal lining and no uterine fibroid or cancer ( very low suspicion)   - US PELV NON OB W TV; Future  - AMB POC URINALYSIS DIP STICK AUTO W/O MICRO    Discussed using a lubricant to prevent vaginal trauma in the future        Harleen Glasgow MD

## 2019-08-01 NOTE — PROGRESS NOTES
Identified pt with two pt identifiers(name and ). Reviewed record in preparation for visit and have obtained necessary documentation. Chief Complaint   Patient presents with    Post Menopausal Bleeding     Pt states it only haapened once last week post intercourse. Health Maintenance Due   Topic    DTaP/Tdap/Td series (1 - Tdap)    Influenza Age 5 to Adult        Coordination of Care Questionnaire:  :   1) Have you been to an emergency room, urgent care, or hospitalized since your last visit? If yes, where when, and reason for visit? no       2. Have seen or consulted any other health care provider since your last visit? If yes, where when, and reason for visit?   NO

## 2019-08-22 ENCOUNTER — HOSPITAL ENCOUNTER (OUTPATIENT)
Dept: ULTRASOUND IMAGING | Age: 49
Discharge: HOME | End: 2019-08-22
Attending: INTERNAL MEDICINE

## 2019-08-22 ENCOUNTER — TELEPHONE (OUTPATIENT)
Dept: INTERNAL MEDICINE CLINIC | Age: 49
End: 2019-08-22

## 2019-08-22 DIAGNOSIS — N95.0 POSTMENOPAUSAL BLEEDING: ICD-10-CM

## 2019-08-22 NOTE — TELEPHONE ENCOUNTER
#[phone] pt states the US that you ordered is going to cost pt $1000 and needs to discuss the test with you.   Pt has not done the US as of yet due to cost.

## 2019-08-22 NOTE — TELEPHONE ENCOUNTER
MD Steph Ha, LPN   Caller: Unspecified (Today, 11:12 AM)             IF symptoms resolved and no recurrent bleeding she does not have to repeat US since we found a reason for bleeding - ( the vaginal tear)  if bleeding occurs again will need US.   pelvic US was meant to absolutely rule out uterine malignancy which is a cause of postmenopausal bleeding we cannot afford to miss      Spoke with patient. Two pt identifiers confirmed. Patient advised of the above message from Dr. Jenni Patel. Pt verbalized understanding of information discussed w/ no further questions at this time.

## 2020-08-28 ENCOUNTER — TELEPHONE (OUTPATIENT)
Dept: INTERNAL MEDICINE CLINIC | Age: 50
End: 2020-08-28

## 2020-08-28 DIAGNOSIS — M25.579 CHRONIC ANKLE PAIN, UNSPECIFIED LATERALITY: Primary | ICD-10-CM

## 2020-08-28 DIAGNOSIS — G89.29 CHRONIC ANKLE PAIN, UNSPECIFIED LATERALITY: Primary | ICD-10-CM

## 2020-08-28 NOTE — TELEPHONE ENCOUNTER
#[phone]  Pt states that she has had ankle pain in the back of ankle for about a month now. Pt states that the pain is getting worse. Does she need an appt with Dr. Oh Woods or does doctor want to refer her out she ask?      Please call pt

## 2021-10-22 ENCOUNTER — TRANSCRIBE ORDER (OUTPATIENT)
Dept: SCHEDULING | Age: 51
End: 2021-10-22

## 2021-10-22 DIAGNOSIS — Z12.31 SCREENING MAMMOGRAM FOR HIGH-RISK PATIENT: Primary | ICD-10-CM

## 2021-11-26 ENCOUNTER — HOSPITAL ENCOUNTER (OUTPATIENT)
Dept: MAMMOGRAPHY | Age: 51
Discharge: HOME | End: 2021-11-26
Attending: SPECIALIST
Payer: COMMERCIAL

## 2021-11-26 DIAGNOSIS — Z12.31 SCREENING MAMMOGRAM FOR HIGH-RISK PATIENT: ICD-10-CM

## 2021-11-26 PROCEDURE — 77067 SCR MAMMO BI INCL CAD: CPT

## 2021-12-06 NOTE — PERIOP NOTES
CALLED TO SCHEDULE COVID TESTING,PATIENT WANTS TO DO LOCALLY AND WILL BRING THE RESULT ON DOS        PATIENT CALLED AND MADE AWARE OF COVID-19 TESTING NEEDED TO BE DONE PRIOR TO SURGERY. COVID-19 TESTING APPOINTMENT MADE FOR PATIENT. PATIENT INSTRUCTED ON SELF QUARANTINE BETWEEN TESTING AND ARRIVAL TIME DAY OF SURGERY.

## 2021-12-14 ENCOUNTER — ANESTHESIA EVENT (OUTPATIENT)
Dept: SURGERY | Age: 51
DRG: 742 | End: 2021-12-14
Payer: COMMERCIAL

## 2021-12-14 RX ORDER — ERGOCALCIFEROL 1.25 MG/1
50000 CAPSULE ORAL
COMMUNITY

## 2021-12-14 RX ORDER — GLUCOSAM/CHONDRO/HERB 149/HYAL 750-100 MG
1 TABLET ORAL DAILY
COMMUNITY

## 2021-12-14 RX ORDER — CHOLECALCIFEROL (VITAMIN D3) 50 MCG
1 CAPSULE ORAL DAILY
COMMUNITY

## 2021-12-14 RX ORDER — NAPROXEN 500 MG/1
500 TABLET ORAL 2 TIMES DAILY WITH MEALS
Status: ON HOLD | COMMUNITY
End: 2022-06-06

## 2021-12-14 RX ORDER — NITROGLYCERIN 40 MG/1
0.25 PATCH TRANSDERMAL DAILY
Status: ON HOLD | COMMUNITY
End: 2022-06-06

## 2021-12-14 RX ORDER — BISMUTH SUBSALICYLATE 262 MG
1 TABLET,CHEWABLE ORAL DAILY
COMMUNITY

## 2021-12-14 NOTE — PERIOP NOTES
[address] INSTRUCTIONS    Surgery Date:   12/15/21    Surgery arrival time given by surgeon: NO     If no, [facility] staff will call you between 4 PM- 8 PM the day before surgery with your arrival time. If your surgery is on a Monday, we will call you the preceding Friday. Please call [phone] after 8 PM if you did not receive your arrival time. 1. Please report to [facility] Patient Access/Admitting on the 1st floor. Bring your insurance card, photo identification, and any copayment ( if applicable). 2. If you are going home the same day of your surgery, you must have a responsible adult to drive you home. You need to have a responsible adult to stay with you the first 24 hours after surgery and you should not drive a car for 24 hours following your surgery. 3. Nothing to eat or drink after midnight the night before surgery. This includes no water, gum, mints, coffee, juice, etc.  Please note special instructions, if applicable, below for medications. 4. Do NOT drink alcohol or smoke 24 hours before surgery. STOP smoking for 14 days prior as it helps with breathing and healing after surgery. 5. If you are being admitted to the hospital, please leave personal belongings/luggage in your car until you have an assigned hospital room number. 6. Please wear comfortable clothes. Wear your glasses instead of contacts. We ask that all money, jewelry and valuables be left at home. Wear no make up, particularly mascara, the day of surgery. 7.  All body piercings, rings, and jewelry need to be removed and left at home. Please wear your hair loose or down. Please no pony-tails, buns, or any metal hair accessories. If you shower the morning of surgery, please do not apply any lotions or powders afterwards. You may wear deodorant, unless having breast surgery. Do not shave any body area within 24 hours of your surgery.   8. Please follow all instructions to avoid any potential surgical cancellation. 9. Should your physical condition change, (i.e. fever, cold, flu, etc.) please notify your surgeon as soon as possible. 10. It is important to be on time. If a situation occurs where you may be delayed, please call:  [phone] / [phone] on the day of surgery. 11. The Preadmission Testing staff can be reached at [phone]. 12. Special instructions: NONE      No current facility-administered medications for this encounter. Current Outpatient Medications   Medication Sig    naproxen (NAPROSYN) 500 mg tablet Take 500 mg by mouth two (2) times daily (with meals).  ergocalciferol (Vitamin D2) 1,250 mcg (50,000 unit) capsule Take 50,000 Units by mouth every seven (7) days. Saturday    nitroglycerin (NITRODUR) 0.2 mg/hr 0.25 Patches by TransDERmal route daily. APPLIED TO ANKLE    omega 3-DHA-EPA-fish oil (Fish OiL) 1,000 mg (120 mg-180 mg) capsule Take 1 Capsule by mouth daily.  multivitamin (ONE A DAY) tablet Take 1 Tablet by mouth daily.  elderberry fruit (ELDERBERRY PO) Take 1 Tablet by mouth daily.  B-complex with vitamin C (VITAMIN B COMPLEX-C PO) Take 1 Tablet by mouth daily.  B.infantis-B.ani-B.long-B.bifi (Probiotic 4X) 10-15 mg TbEC Take 1 Tablet by mouth daily. 1. YOU MUST ONLY TAKE THESE MEDICATIONS THE MORNING OF SURGERY WITH A SIP OF WATER: NONE  2. MEDICATIONS TO TAKE THE MORNING OF SURGERY ONLY IF NEEDED: NONE  3. HOLD these medications BEFORE Surgery: NONE  4. Ask your surgeon/prescribing physician about when/if to STOP taking these medications: NONE  5. Stop all vitamins, herbal medicines and Aspirin containing products 7 days prior to surgery. Stop any non-steroidal anti-inflammatory drugs (i.e. Ibuprofen, Naproxen, Advil, Aleve) 3 days before surgery. You may take Tylenol. 6. If you are currently taking Plavix, Coumadin,or any other blood-thinning/anticoagulant medication contact your prescribing physician for instructions.     Preventing Infections Before and After - Your Surgery    IMPORTANT INSTRUCTIONS    Please read and follow these instructions carefully. If you are unable to comply with the below instructions your procedure will be cancelled. You play an important role in your health and preparation for surgery. To reduce the germs on your skin you will need to shower with CHG soap (Chorhexidine gluconate 4%) two times before surgery. CHG soap (Hibiclens, Hex-A-Clens or store brand)   CHG soap will be provided at your Preadmission Testing (PAT) appointment.  If you do not have a PAT appointment before surgery, you may arrange to  CHG soap from our office or purchase CHG soap at a pharmacy, grocery or department store.  You need to purchase TWO 4 ounce bottles to use for your 2 showers. Steps to follow:  1. Wash your hair with your normal shampoo and your body with regular soap and rinse well to remove shampoo and soap from your skin. 2. Wet a clean washcloth and turn off the shower. 3. Put CHG soap on washcloth and apply to your entire body from the neck down. Do not use on your head, face or private parts(genitals). Do not use CHG soap on open sores, wounds or areas of skin irritation. 4. Wash you body gently for 5 minutes. Do not wash your skin too hard. This soap does not create lather. Pay special attention to your underarms and from your belly button to your feet. 5. Turn the shower back on and rinse well to get CHG soap off your body. 6. Pat your skin dry with a clean, dry towel. Do not apply lotions or moisturizer. 7. Put on clean clothes and sleep on fresh bed sheets and do not allow pets to sleep with you. Shower with CHG soap 2 times before your surgery   The evening before your surgery   The morning of your surgery      Tips to help prevent infections after your surgery:  1.  Protect your surgical wound from germs:  ? Hand washing is the most important thing you and your caregivers can do to prevent infections. ? Keep your bandage clean and dry! ? Do not touch your surgical wound. 2. Use clean, freshly washed towels and washcloths every time you shower; do not share bath linens with others. 3. Until your surgical wound is healed, wear clothing and sleep on bed linens each day that are clean and freshly washed. 4. Do not allow pets to sleep in your bed with you or touch your surgical wound. 5. Do not smoke - smoking delays wound healing. This may be a good time to stop smoking. 6. If you have diabetes, it is important for you to manage your blood sugar levels properly before your surgery as well as after your surgery. Poorly managed blood sugar levels slow down wound healing and prevent you from healing completely. Good Jew   Instructions for Pre-Surgery COVID-19 Testing     Across our ministry we have established standard guidelines to ensure the health and safety of our patients, residents and associates as we resume elective services for patients. All patients presenting for surgery are required to have a COVID-19 test result within 96 hours of their scheduled surgery. [facility] is providing this test free of charge to the patient.    Instructions for COVID-19 Testing:     Patients will receive a call from Pre-Admission Testing 4-5 days prior to surgery to schedule a date and time to come to the [address] [place] [address] Drive for their COVID-19 test   Patients are advised to self-quarantine after testing until their scheduled surgery   Once on site, patients will be registered and receive COVID test in their vehicle   If a patient is scheduled for normal Pre Admission Testing 96 hours from date of surgery, the patient will still have their COVID test done at the [address] [place] [address] located at [address] [place] [address] Positive results will be shared with the surgeon and anesthesiologist and may result in cancellation of the elective procedure    Testing Hours and Location:   Address:  [address] [facility] Pre Admission 11 [facility] in the Discharge Lot on [address] [place] [address] (Map Attached)  Dhruv Rivera [address], [address]   Hours: Monday- Friday 7a-3p, Saturday and Sunday 7a-10a    PAT Phone Number: [phone]            Patient Information Regarding COVID Restrictions    Patients are advised to self-quarantine after COVID testing up to the day of the scheduled procedure. Day of Procedure     Please park in the parking deck or any designated visitor parking lot.  Enter the facility through the Main Entrance of the hospital.   A temperature check and appropriate symptom/exposure screening will be done prior to entry to the facility.  On the day of surgery, please provide the cell phone number of the person who will be waiting for you to the Patient Access representative at the time of registration.  Please wear a mask on the day of your procedure.  We are now allowing one designated visitor per stay. Pediatric patients may have 2 designated visitors. This one person may come in with you on the day of your procedure.  No visitors under the age of 13.  The designated visitor must also wear a mask.  Once your procedure and the immediate recovery period is completed, a nurse in the recovery area will contact your designated visitor to inform them of your room number or to otherwise review other pertinent information regarding your care.  Social distancing practices are to be adhered to in waiting areas and the cafeteria. The patient was contacted  via phone. She  verbalize  understanding of all instructions does not  need reinforcement.

## 2021-12-15 ENCOUNTER — HOSPITAL ENCOUNTER (INPATIENT)
Age: 51
LOS: 6 days | Discharge: HOME | DRG: 742 | End: 2021-12-24
Attending: SPECIALIST | Admitting: SPECIALIST
Payer: COMMERCIAL

## 2021-12-15 ENCOUNTER — ANESTHESIA (OUTPATIENT)
Dept: SURGERY | Age: 51
DRG: 742 | End: 2021-12-15
Payer: COMMERCIAL

## 2021-12-15 DIAGNOSIS — R52 PAIN: Primary | ICD-10-CM

## 2021-12-15 DIAGNOSIS — Z90.710 S/P HYSTERECTOMY: ICD-10-CM

## 2021-12-15 PROBLEM — D25.9 FIBROID UTERUS: Status: ACTIVE | Noted: 2021-12-15

## 2021-12-15 LAB
HGB BLD-MCNC: 13.3 G/DL (ref 11.5–16)
HGB BLD-MCNC: 13.6 G/DL (ref 11.5–16)

## 2021-12-15 PROCEDURE — 74011000250 HC RX REV CODE- 250: Performed by: NURSE ANESTHETIST, CERTIFIED REGISTERED

## 2021-12-15 PROCEDURE — 0UT24ZZ RESECTION OF BILATERAL OVARIES, PERCUTANEOUS ENDOSCOPIC APPROACH: ICD-10-PCS | Performed by: SPECIALIST

## 2021-12-15 PROCEDURE — 77030002895 HC DEV VASC CLOSR COVD -B: Performed by: SPECIALIST

## 2021-12-15 PROCEDURE — 77030026438 HC STYL ET INTUB CARD -A: Performed by: ANESTHESIOLOGY

## 2021-12-15 PROCEDURE — 74011000250 HC RX REV CODE- 250: Performed by: SPECIALIST

## 2021-12-15 PROCEDURE — 77030008771 HC TU NG SALEM SUMP -A: Performed by: ANESTHESIOLOGY

## 2021-12-15 PROCEDURE — 77030031492 HC PRT ACC BLNT AIRSEAL CNMD -B: Performed by: SPECIALIST

## 2021-12-15 PROCEDURE — 77030002933 HC SUT MCRYL J&J -A: Performed by: SPECIALIST

## 2021-12-15 PROCEDURE — 77030020268 HC MISC GENERAL SUPPLY: Performed by: SPECIALIST

## 2021-12-15 PROCEDURE — 2709999900 HC NON-CHARGEABLE SUPPLY: Performed by: SPECIALIST

## 2021-12-15 PROCEDURE — 77030040922 HC BLNKT HYPOTHRM STRY -A

## 2021-12-15 PROCEDURE — 0DNN4ZZ RELEASE SIGMOID COLON, PERCUTANEOUS ENDOSCOPIC APPROACH: ICD-10-PCS | Performed by: SPECIALIST

## 2021-12-15 PROCEDURE — 74011250636 HC RX REV CODE- 250/636: Performed by: ANESTHESIOLOGY

## 2021-12-15 PROCEDURE — 0UT94ZL RESECTION OF UTERUS, SUPRACERVICAL, PERCUTANEOUS ENDOSCOPIC APPROACH: ICD-10-PCS | Performed by: SPECIALIST

## 2021-12-15 PROCEDURE — 74011250636 HC RX REV CODE- 250/636: Performed by: SPECIALIST

## 2021-12-15 PROCEDURE — 77030034133 HC APPL ENDOSCP FLX SPRY BAXT -C: Performed by: SPECIALIST

## 2021-12-15 PROCEDURE — G0378 HOSPITAL OBSERVATION PER HR: HCPCS

## 2021-12-15 PROCEDURE — 77030026243 HC MANIP UTER VCAR LSIS -B: Performed by: SPECIALIST

## 2021-12-15 PROCEDURE — 74011250637 HC RX REV CODE- 250/637: Performed by: ANESTHESIOLOGY

## 2021-12-15 PROCEDURE — 77030039147 HC PWDR HEMSTS SURGICEL JNJ -D: Performed by: SPECIALIST

## 2021-12-15 PROCEDURE — 76010000877 HC OR TIME 2.5 TO 3HR INTENSV - TIER 2: Performed by: SPECIALIST

## 2021-12-15 PROCEDURE — 74011250636 HC RX REV CODE- 250/636: Performed by: NURSE ANESTHETIST, CERTIFIED REGISTERED

## 2021-12-15 PROCEDURE — 77030020703 HC SEAL CANN DISP INTU -B: Performed by: SPECIALIST

## 2021-12-15 PROCEDURE — 74011250637 HC RX REV CODE- 250/637: Performed by: SPECIALIST

## 2021-12-15 PROCEDURE — 77030008684 HC TU ET CUF COVD -B: Performed by: ANESTHESIOLOGY

## 2021-12-15 PROCEDURE — 88307 TISSUE EXAM BY PATHOLOGIST: CPT

## 2021-12-15 PROCEDURE — 77030003578 HC NDL INSUF VERES AMR -B: Performed by: SPECIALIST

## 2021-12-15 PROCEDURE — 77030008606 HC TRCR ENDOSC KII AMR -B: Performed by: SPECIALIST

## 2021-12-15 PROCEDURE — 77030031139 HC SUT VCRL2 J&J -A: Performed by: SPECIALIST

## 2021-12-15 PROCEDURE — 0DN84ZZ RELEASE SMALL INTESTINE, PERCUTANEOUS ENDOSCOPIC APPROACH: ICD-10-PCS | Performed by: SPECIALIST

## 2021-12-15 PROCEDURE — 74011250636 HC RX REV CODE- 250/636

## 2021-12-15 PROCEDURE — 77030041523 HC SEALNT FIBRN VITASEAL J&J -E: Performed by: SPECIALIST

## 2021-12-15 PROCEDURE — 77030035277 HC OBTRTR BLDELSS DISP INTU -B: Performed by: SPECIALIST

## 2021-12-15 PROCEDURE — 0UT74ZZ RESECTION OF BILATERAL FALLOPIAN TUBES, PERCUTANEOUS ENDOSCOPIC APPROACH: ICD-10-PCS | Performed by: SPECIALIST

## 2021-12-15 PROCEDURE — 76060000036 HC ANESTHESIA 2.5 TO 3 HR: Performed by: SPECIALIST

## 2021-12-15 PROCEDURE — 76210000017 HC OR PH I REC 1.5 TO 2 HR: Performed by: SPECIALIST

## 2021-12-15 PROCEDURE — C1782 MORCELLATOR: HCPCS | Performed by: SPECIALIST

## 2021-12-15 PROCEDURE — 85018 HEMOGLOBIN: CPT

## 2021-12-15 RX ORDER — ROCURONIUM BROMIDE 10 MG/ML
INJECTION, SOLUTION INTRAVENOUS AS NEEDED
Status: DISCONTINUED | OUTPATIENT
Start: 2021-12-15 | End: 2021-12-15 | Stop reason: HOSPADM

## 2021-12-15 RX ORDER — MIDAZOLAM HYDROCHLORIDE 1 MG/ML
1 INJECTION, SOLUTION INTRAMUSCULAR; INTRAVENOUS AS NEEDED
Status: DISCONTINUED | OUTPATIENT
Start: 2021-12-15 | End: 2021-12-15 | Stop reason: HOSPADM

## 2021-12-15 RX ORDER — LIDOCAINE HYDROCHLORIDE 20 MG/ML
INJECTION, SOLUTION EPIDURAL; INFILTRATION; INTRACAUDAL; PERINEURAL AS NEEDED
Status: DISCONTINUED | OUTPATIENT
Start: 2021-12-15 | End: 2021-12-15 | Stop reason: HOSPADM

## 2021-12-15 RX ORDER — PHENYLEPHRINE HCL IN 0.9% NACL 0.4MG/10ML
SYRINGE (ML) INTRAVENOUS AS NEEDED
Status: DISCONTINUED | OUTPATIENT
Start: 2021-12-15 | End: 2021-12-15 | Stop reason: HOSPADM

## 2021-12-15 RX ORDER — SODIUM CHLORIDE 0.9 % (FLUSH) 0.9 %
5-40 SYRINGE (ML) INJECTION EVERY 8 HOURS
Status: DISCONTINUED | OUTPATIENT
Start: 2021-12-15 | End: 2021-12-15 | Stop reason: HOSPADM

## 2021-12-15 RX ORDER — DEXAMETHASONE SODIUM PHOSPHATE 4 MG/ML
INJECTION, SOLUTION INTRA-ARTICULAR; INTRALESIONAL; INTRAMUSCULAR; INTRAVENOUS; SOFT TISSUE AS NEEDED
Status: DISCONTINUED | OUTPATIENT
Start: 2021-12-15 | End: 2021-12-15 | Stop reason: HOSPADM

## 2021-12-15 RX ORDER — FENTANYL CITRATE 50 UG/ML
INJECTION, SOLUTION INTRAMUSCULAR; INTRAVENOUS AS NEEDED
Status: DISCONTINUED | OUTPATIENT
Start: 2021-12-15 | End: 2021-12-15 | Stop reason: HOSPADM

## 2021-12-15 RX ORDER — METRONIDAZOLE 500 MG/100ML
500 INJECTION, SOLUTION INTRAVENOUS
Status: COMPLETED | OUTPATIENT
Start: 2021-12-15 | End: 2021-12-15

## 2021-12-15 RX ORDER — OXYCODONE AND ACETAMINOPHEN 5; 325 MG/1; MG/1
1 TABLET ORAL
Status: DISCONTINUED | OUTPATIENT
Start: 2021-12-15 | End: 2021-12-18

## 2021-12-15 RX ORDER — NEOSTIGMINE METHYLSULFATE 1 MG/ML
INJECTION INTRAVENOUS AS NEEDED
Status: DISCONTINUED | OUTPATIENT
Start: 2021-12-15 | End: 2021-12-15 | Stop reason: HOSPADM

## 2021-12-15 RX ORDER — SODIUM CHLORIDE 0.9 % (FLUSH) 0.9 %
5-40 SYRINGE (ML) INJECTION AS NEEDED
Status: DISCONTINUED | OUTPATIENT
Start: 2021-12-15 | End: 2021-12-24 | Stop reason: HOSPADM

## 2021-12-15 RX ORDER — SODIUM CHLORIDE 0.9 % (FLUSH) 0.9 %
5-40 SYRINGE (ML) INJECTION AS NEEDED
Status: DISCONTINUED | OUTPATIENT
Start: 2021-12-15 | End: 2021-12-15 | Stop reason: HOSPADM

## 2021-12-15 RX ORDER — DEXMEDETOMIDINE HYDROCHLORIDE 100 UG/ML
INJECTION, SOLUTION INTRAVENOUS AS NEEDED
Status: DISCONTINUED | OUTPATIENT
Start: 2021-12-15 | End: 2021-12-15 | Stop reason: HOSPADM

## 2021-12-15 RX ORDER — MIDAZOLAM HYDROCHLORIDE 1 MG/ML
1 INJECTION, SOLUTION INTRAMUSCULAR; INTRAVENOUS
Status: DISCONTINUED | OUTPATIENT
Start: 2021-12-15 | End: 2021-12-15 | Stop reason: HOSPADM

## 2021-12-15 RX ORDER — FENTANYL CITRATE 50 UG/ML
50 INJECTION, SOLUTION INTRAMUSCULAR; INTRAVENOUS AS NEEDED
Status: DISCONTINUED | OUTPATIENT
Start: 2021-12-15 | End: 2021-12-15 | Stop reason: HOSPADM

## 2021-12-15 RX ORDER — SODIUM CHLORIDE, SODIUM LACTATE, POTASSIUM CHLORIDE, CALCIUM CHLORIDE 600; 310; 30; 20 MG/100ML; MG/100ML; MG/100ML; MG/100ML
INJECTION, SOLUTION INTRAVENOUS
Status: DISCONTINUED | OUTPATIENT
Start: 2021-12-15 | End: 2021-12-15

## 2021-12-15 RX ORDER — GLYCOPYRROLATE 0.2 MG/ML
INJECTION INTRAMUSCULAR; INTRAVENOUS AS NEEDED
Status: DISCONTINUED | OUTPATIENT
Start: 2021-12-15 | End: 2021-12-15 | Stop reason: HOSPADM

## 2021-12-15 RX ORDER — CEFAZOLIN SODIUM 1 G/3ML
INJECTION, POWDER, FOR SOLUTION INTRAMUSCULAR; INTRAVENOUS AS NEEDED
Status: DISCONTINUED | OUTPATIENT
Start: 2021-12-15 | End: 2021-12-15 | Stop reason: HOSPADM

## 2021-12-15 RX ORDER — LIDOCAINE HYDROCHLORIDE 10 MG/ML
0.5 INJECTION, SOLUTION EPIDURAL; INFILTRATION; INTRACAUDAL; PERINEURAL AS NEEDED
Status: DISCONTINUED | OUTPATIENT
Start: 2021-12-15 | End: 2021-12-15 | Stop reason: HOSPADM

## 2021-12-15 RX ORDER — SODIUM CHLORIDE, SODIUM LACTATE, POTASSIUM CHLORIDE, CALCIUM CHLORIDE 600; 310; 30; 20 MG/100ML; MG/100ML; MG/100ML; MG/100ML
125 INJECTION, SOLUTION INTRAVENOUS CONTINUOUS
Status: DISCONTINUED | OUTPATIENT
Start: 2021-12-15 | End: 2021-12-15 | Stop reason: HOSPADM

## 2021-12-15 RX ORDER — MORPHINE SULFATE 2 MG/ML
2 INJECTION, SOLUTION INTRAMUSCULAR; INTRAVENOUS
Status: DISCONTINUED | OUTPATIENT
Start: 2021-12-15 | End: 2021-12-15 | Stop reason: HOSPADM

## 2021-12-15 RX ORDER — PROPOFOL 10 MG/ML
INJECTION, EMULSION INTRAVENOUS AS NEEDED
Status: DISCONTINUED | OUTPATIENT
Start: 2021-12-15 | End: 2021-12-15 | Stop reason: HOSPADM

## 2021-12-15 RX ORDER — ENOXAPARIN SODIUM 100 MG/ML
40 INJECTION SUBCUTANEOUS
Status: COMPLETED | OUTPATIENT
Start: 2021-12-15 | End: 2021-12-15

## 2021-12-15 RX ORDER — KETOROLAC TROMETHAMINE 30 MG/ML
15 INJECTION, SOLUTION INTRAMUSCULAR; INTRAVENOUS
Status: DISPENSED | OUTPATIENT
Start: 2021-12-15 | End: 2021-12-16

## 2021-12-15 RX ORDER — SODIUM CHLORIDE 0.9 % (FLUSH) 0.9 %
5-40 SYRINGE (ML) INJECTION EVERY 8 HOURS
Status: DISCONTINUED | OUTPATIENT
Start: 2021-12-15 | End: 2021-12-24 | Stop reason: HOSPADM

## 2021-12-15 RX ORDER — OXYCODONE AND ACETAMINOPHEN 5; 325 MG/1; MG/1
1 TABLET ORAL
Qty: 20 TABLET | Refills: 0 | Status: SHIPPED | OUTPATIENT
Start: 2021-12-15 | End: 2021-12-20

## 2021-12-15 RX ORDER — HYDROMORPHONE HYDROCHLORIDE 2 MG/ML
INJECTION, SOLUTION INTRAMUSCULAR; INTRAVENOUS; SUBCUTANEOUS AS NEEDED
Status: DISCONTINUED | OUTPATIENT
Start: 2021-12-15 | End: 2021-12-15 | Stop reason: HOSPADM

## 2021-12-15 RX ORDER — DIPHENHYDRAMINE HYDROCHLORIDE 50 MG/ML
12.5 INJECTION, SOLUTION INTRAMUSCULAR; INTRAVENOUS
Status: DISCONTINUED | OUTPATIENT
Start: 2021-12-15 | End: 2021-12-24 | Stop reason: HOSPADM

## 2021-12-15 RX ORDER — SIMETHICONE 80 MG
80 TABLET,CHEWABLE ORAL
Status: DISCONTINUED | OUTPATIENT
Start: 2021-12-15 | End: 2021-12-24 | Stop reason: HOSPADM

## 2021-12-15 RX ORDER — ACETAMINOPHEN 325 MG/1
650 TABLET ORAL ONCE
Status: COMPLETED | OUTPATIENT
Start: 2021-12-15 | End: 2021-12-15

## 2021-12-15 RX ORDER — DIPHENHYDRAMINE HYDROCHLORIDE 50 MG/ML
12.5 INJECTION, SOLUTION INTRAMUSCULAR; INTRAVENOUS AS NEEDED
Status: DISCONTINUED | OUTPATIENT
Start: 2021-12-15 | End: 2021-12-15 | Stop reason: HOSPADM

## 2021-12-15 RX ORDER — FENTANYL CITRATE 50 UG/ML
25 INJECTION, SOLUTION INTRAMUSCULAR; INTRAVENOUS
Status: COMPLETED | OUTPATIENT
Start: 2021-12-15 | End: 2021-12-15

## 2021-12-15 RX ORDER — DEXTROSE, SODIUM CHLORIDE, SODIUM LACTATE, POTASSIUM CHLORIDE, AND CALCIUM CHLORIDE 5; .6; .31; .03; .02 G/100ML; G/100ML; G/100ML; G/100ML; G/100ML
125 INJECTION, SOLUTION INTRAVENOUS CONTINUOUS
Status: DISCONTINUED | OUTPATIENT
Start: 2021-12-15 | End: 2021-12-15 | Stop reason: HOSPADM

## 2021-12-15 RX ORDER — HYDROMORPHONE HYDROCHLORIDE 1 MG/ML
1 INJECTION, SOLUTION INTRAMUSCULAR; INTRAVENOUS; SUBCUTANEOUS
Status: DISCONTINUED | OUTPATIENT
Start: 2021-12-15 | End: 2021-12-24 | Stop reason: HOSPADM

## 2021-12-15 RX ORDER — SODIUM CHLORIDE, SODIUM LACTATE, POTASSIUM CHLORIDE, CALCIUM CHLORIDE 600; 310; 30; 20 MG/100ML; MG/100ML; MG/100ML; MG/100ML
150 INJECTION, SOLUTION INTRAVENOUS CONTINUOUS
Status: DISCONTINUED | OUTPATIENT
Start: 2021-12-15 | End: 2021-12-17

## 2021-12-15 RX ORDER — ONDANSETRON 2 MG/ML
4 INJECTION INTRAMUSCULAR; INTRAVENOUS
Status: DISCONTINUED | OUTPATIENT
Start: 2021-12-15 | End: 2021-12-24 | Stop reason: HOSPADM

## 2021-12-15 RX ORDER — BUPIVACAINE HYDROCHLORIDE 5 MG/ML
INJECTION, SOLUTION EPIDURAL; INTRACAUDAL AS NEEDED
Status: DISCONTINUED | OUTPATIENT
Start: 2021-12-15 | End: 2021-12-15 | Stop reason: HOSPADM

## 2021-12-15 RX ORDER — FUROSEMIDE 10 MG/ML
20 INJECTION INTRAMUSCULAR; INTRAVENOUS ONCE
Status: DISPENSED | OUTPATIENT
Start: 2021-12-15 | End: 2021-12-16

## 2021-12-15 RX ORDER — KETOROLAC TROMETHAMINE 30 MG/ML
INJECTION, SOLUTION INTRAMUSCULAR; INTRAVENOUS
Status: COMPLETED
Start: 2021-12-15 | End: 2021-12-15

## 2021-12-15 RX ORDER — KETOROLAC TROMETHAMINE 30 MG/ML
INJECTION, SOLUTION INTRAMUSCULAR; INTRAVENOUS AS NEEDED
Status: DISCONTINUED | OUTPATIENT
Start: 2021-12-15 | End: 2021-12-15 | Stop reason: HOSPADM

## 2021-12-15 RX ORDER — ONDANSETRON 2 MG/ML
INJECTION INTRAMUSCULAR; INTRAVENOUS AS NEEDED
Status: DISCONTINUED | OUTPATIENT
Start: 2021-12-15 | End: 2021-12-15 | Stop reason: HOSPADM

## 2021-12-15 RX ORDER — ONDANSETRON 2 MG/ML
4 INJECTION INTRAMUSCULAR; INTRAVENOUS AS NEEDED
Status: DISCONTINUED | OUTPATIENT
Start: 2021-12-15 | End: 2021-12-15 | Stop reason: HOSPADM

## 2021-12-15 RX ORDER — OXYCODONE HYDROCHLORIDE 5 MG/1
5 TABLET ORAL AS NEEDED
Status: DISCONTINUED | OUTPATIENT
Start: 2021-12-15 | End: 2021-12-15 | Stop reason: HOSPADM

## 2021-12-15 RX ORDER — NITROGLYCERIN 40 MG/1
1 PATCH TRANSDERMAL DAILY
Status: DISCONTINUED | OUTPATIENT
Start: 2021-12-16 | End: 2021-12-24 | Stop reason: HOSPADM

## 2021-12-15 RX ORDER — SUCCINYLCHOLINE CHLORIDE 20 MG/ML
INJECTION INTRAMUSCULAR; INTRAVENOUS AS NEEDED
Status: DISCONTINUED | OUTPATIENT
Start: 2021-12-15 | End: 2021-12-15 | Stop reason: HOSPADM

## 2021-12-15 RX ORDER — MIDAZOLAM HYDROCHLORIDE 1 MG/ML
INJECTION, SOLUTION INTRAMUSCULAR; INTRAVENOUS AS NEEDED
Status: DISCONTINUED | OUTPATIENT
Start: 2021-12-15 | End: 2021-12-15 | Stop reason: HOSPADM

## 2021-12-15 RX ADMIN — HYDROMORPHONE HYDROCHLORIDE 0.5 MG: 2 INJECTION, SOLUTION INTRAMUSCULAR; INTRAVENOUS; SUBCUTANEOUS at 11:17

## 2021-12-15 RX ADMIN — ROCURONIUM BROMIDE 10 MG: 10 SOLUTION INTRAVENOUS at 09:52

## 2021-12-15 RX ADMIN — FENTANYL CITRATE 25 MCG: 50 INJECTION INTRAMUSCULAR; INTRAVENOUS at 12:27

## 2021-12-15 RX ADMIN — ROCURONIUM BROMIDE 10 MG: 10 SOLUTION INTRAVENOUS at 09:29

## 2021-12-15 RX ADMIN — GLYCOPYRROLATE 0.4 MG: 0.2 INJECTION, SOLUTION INTRAMUSCULAR; INTRAVENOUS at 11:00

## 2021-12-15 RX ADMIN — ENOXAPARIN SODIUM 40 MG: 100 INJECTION SUBCUTANEOUS at 08:37

## 2021-12-15 RX ADMIN — HYDROMORPHONE HYDROCHLORIDE 1 MG: 1 INJECTION, SOLUTION INTRAMUSCULAR; INTRAVENOUS; SUBCUTANEOUS at 19:53

## 2021-12-15 RX ADMIN — FENTANYL CITRATE 50 MCG: 50 INJECTION, SOLUTION INTRAMUSCULAR; INTRAVENOUS at 09:19

## 2021-12-15 RX ADMIN — MEPERIDINE HYDROCHLORIDE 12.5 MG: 25 INJECTION INTRAMUSCULAR; INTRAVENOUS; SUBCUTANEOUS at 12:30

## 2021-12-15 RX ADMIN — SODIUM CHLORIDE, POTASSIUM CHLORIDE, SODIUM LACTATE AND CALCIUM CHLORIDE 1000 ML: 600; 310; 30; 20 INJECTION, SOLUTION INTRAVENOUS at 22:16

## 2021-12-15 RX ADMIN — HYDROMORPHONE HYDROCHLORIDE 0.5 MG: 2 INJECTION, SOLUTION INTRAMUSCULAR; INTRAVENOUS; SUBCUTANEOUS at 11:25

## 2021-12-15 RX ADMIN — PROPOFOL 150 MG: 10 INJECTION, EMULSION INTRAVENOUS at 08:47

## 2021-12-15 RX ADMIN — ROCURONIUM BROMIDE 10 MG: 10 SOLUTION INTRAVENOUS at 10:12

## 2021-12-15 RX ADMIN — Medication 80 MCG: at 09:04

## 2021-12-15 RX ADMIN — SUCCINYLCHOLINE CHLORIDE 180 MG: 20 INJECTION, SOLUTION INTRAMUSCULAR; INTRAVENOUS at 08:47

## 2021-12-15 RX ADMIN — FENTANYL CITRATE 50 MCG: 50 INJECTION, SOLUTION INTRAMUSCULAR; INTRAVENOUS at 08:47

## 2021-12-15 RX ADMIN — HYDROMORPHONE HYDROCHLORIDE 1 MG: 1 INJECTION, SOLUTION INTRAMUSCULAR; INTRAVENOUS; SUBCUTANEOUS at 23:29

## 2021-12-15 RX ADMIN — DEXMEDETOMIDINE HYDROCHLORIDE 2 MCG: 100 INJECTION, SOLUTION, CONCENTRATE INTRAVENOUS at 11:06

## 2021-12-15 RX ADMIN — SODIUM CHLORIDE, POTASSIUM CHLORIDE, SODIUM LACTATE AND CALCIUM CHLORIDE 125 ML/HR: 600; 310; 30; 20 INJECTION, SOLUTION INTRAVENOUS at 08:35

## 2021-12-15 RX ADMIN — KETOROLAC TROMETHAMINE 30 MG: 30 INJECTION, SOLUTION INTRAMUSCULAR; INTRAVENOUS at 11:26

## 2021-12-15 RX ADMIN — Medication 80 MG: at 18:45

## 2021-12-15 RX ADMIN — OXYCODONE AND ACETAMINOPHEN 1 TABLET: 5; 325 TABLET ORAL at 17:46

## 2021-12-15 RX ADMIN — FENTANYL CITRATE 25 MCG: 50 INJECTION INTRAMUSCULAR; INTRAVENOUS at 12:15

## 2021-12-15 RX ADMIN — NEOSTIGMINE METHYLSULFATE 3 MG: 1 INJECTION, SOLUTION INTRAVENOUS at 11:00

## 2021-12-15 RX ADMIN — KETOROLAC TROMETHAMINE 30 MG: 30 INJECTION, SOLUTION INTRAMUSCULAR; INTRAVENOUS at 16:04

## 2021-12-15 RX ADMIN — ROCURONIUM BROMIDE 35 MG: 10 SOLUTION INTRAVENOUS at 08:55

## 2021-12-15 RX ADMIN — CEFAZOLIN 3 G: 330 INJECTION, POWDER, FOR SOLUTION INTRAMUSCULAR; INTRAVENOUS at 08:54

## 2021-12-15 RX ADMIN — SODIUM CHLORIDE, POTASSIUM CHLORIDE, SODIUM LACTATE AND CALCIUM CHLORIDE 150 ML/HR: 600; 310; 30; 20 INJECTION, SOLUTION INTRAVENOUS at 12:14

## 2021-12-15 RX ADMIN — ONDANSETRON 4 MG: 2 INJECTION INTRAMUSCULAR; INTRAVENOUS at 11:47

## 2021-12-15 RX ADMIN — Medication 10 ML: at 20:34

## 2021-12-15 RX ADMIN — FENTANYL CITRATE 25 MCG: 50 INJECTION INTRAMUSCULAR; INTRAVENOUS at 11:48

## 2021-12-15 RX ADMIN — METRONIDAZOLE 500 MG: 500 SOLUTION INTRAVENOUS at 09:00

## 2021-12-15 RX ADMIN — ONDANSETRON 4 MG: 2 INJECTION INTRAMUSCULAR; INTRAVENOUS at 19:53

## 2021-12-15 RX ADMIN — DEXMEDETOMIDINE HYDROCHLORIDE 4 MCG: 100 INJECTION, SOLUTION, CONCENTRATE INTRAVENOUS at 11:25

## 2021-12-15 RX ADMIN — ROCURONIUM BROMIDE 5 MG: 10 SOLUTION INTRAVENOUS at 08:47

## 2021-12-15 RX ADMIN — OXYCODONE AND ACETAMINOPHEN 1 TABLET: 5; 325 TABLET ORAL at 22:21

## 2021-12-15 RX ADMIN — FENTANYL CITRATE 25 MCG: 50 INJECTION INTRAMUSCULAR; INTRAVENOUS at 12:00

## 2021-12-15 RX ADMIN — ONDANSETRON 4 MG: 2 INJECTION INTRAMUSCULAR; INTRAVENOUS at 16:02

## 2021-12-15 RX ADMIN — ROCURONIUM BROMIDE 10 MG: 10 SOLUTION INTRAVENOUS at 10:36

## 2021-12-15 RX ADMIN — ACETAMINOPHEN 650 MG: 325 TABLET ORAL at 08:31

## 2021-12-15 RX ADMIN — ONDANSETRON HYDROCHLORIDE 4 MG: 2 INJECTION, SOLUTION INTRAMUSCULAR; INTRAVENOUS at 10:57

## 2021-12-15 RX ADMIN — LIDOCAINE HYDROCHLORIDE 60 MG: 20 INJECTION, SOLUTION EPIDURAL; INFILTRATION; INTRACAUDAL; PERINEURAL at 08:47

## 2021-12-15 RX ADMIN — DEXAMETHASONE SODIUM PHOSPHATE 4 MG: 4 INJECTION, SOLUTION INTRAMUSCULAR; INTRAVENOUS at 08:57

## 2021-12-15 RX ADMIN — DEXMEDETOMIDINE HYDROCHLORIDE 2 MCG: 100 INJECTION, SOLUTION, CONCENTRATE INTRAVENOUS at 11:09

## 2021-12-15 RX ADMIN — MEPERIDINE HYDROCHLORIDE 12.5 MG: 25 INJECTION INTRAMUSCULAR; INTRAVENOUS; SUBCUTANEOUS at 11:49

## 2021-12-15 RX ADMIN — MIDAZOLAM 2 MG: 1 INJECTION INTRAMUSCULAR; INTRAVENOUS at 08:38

## 2021-12-15 NOTE — PROGRESS NOTES
TRANSFER - IN REPORT:    Verbal report received from [facility] REHABILITATION RN(name) on Anish Mayer  being received from PACU(unit) for routine post - op      Report consisted of patients Situation, Background, Assessment and   Recommendations(SBAR). Information from the following report(s) SBAR, Kardex, OR Summary, Procedure Summary, Intake/Output, MAR and Recent Results was reviewed with the receiving nurse. Opportunity for questions and clarification was provided. Assessment completed upon patients arrival to unit and care assumed.

## 2021-12-15 NOTE — ANESTHESIA PREPROCEDURE EVALUATION
Relevant Problems   ENDOCRINE   (+) Obesity, morbid ([facility] [address]       Anesthetic History   No history of anesthetic complications            Review of Systems / Medical History  Patient summary reviewed, nursing notes reviewed and pertinent labs reviewed    Pulmonary  Within defined limits                 Neuro/Psych   Within defined limits           Cardiovascular  Within defined limits                     GI/Hepatic/Renal  Within defined limits              Endo/Other  Within defined limits      Morbid obesity and arthritis     Other Findings              Physical Exam    Airway  Mallampati: II  TM Distance: > 6 cm  Neck ROM: normal range of motion   Mouth opening: Normal     Cardiovascular  Regular rate and rhythm,  S1 and S2 normal,  no murmur, click, rub, or gallop             Dental  No notable dental hx       Pulmonary  Breath sounds clear to auscultation               Abdominal  GI exam deferred       Other Findings            Anesthetic Plan    ASA: 3  Anesthesia type: general          Induction: Intravenous  Anesthetic plan and risks discussed with: Patient

## 2021-12-15 NOTE — H&P
Gynecology History and Physical    Name: Cheri White MRN: [id number] SSN: [id number]    YOB: 1970  Age: 45 y.o. Sex: female       Subjective:      Chief complaint:  Messi Lopez is a 45 y.o.  female with a history of fibroids. Previous workup included Ultrasound which revealed fibroid(s). Previous treatment measures included nonsteroidal anti-inflammatory drugs (NSAID's). She is admitted for Procedure(s) (LRB):  DAVINCI LAPROSCOPIC SUPRACERVICAL HYSTERECTOMY, POSSIBLE TOTAL LAPAROSCOPY HYSTERECTOMY, BILATERAL SALPINGO-OOPHORECTOMY (N/A). The current method of family planning is none. OB History             Para        Term   0            AB        Living           SAB        IAB        Ectopic        Molar        Multiple        Live Births   0              Past Medical History:   Diagnosis Date    Arthritis     Knee injury     RIGHT    Tendonitis     LEFT ANKLE     Past Surgical History:   Procedure Laterality Date    HX ADENOIDECTOMY      HX HEENT      BMT    HX OTHER SURGICAL      RIGHT ANKLE     Social History     Occupational History    Not on file   Tobacco Use    Smoking status: Never Smoker    Smokeless tobacco: Never Used   Vaping Use    Vaping Use: Never used   Substance and Sexual Activity    Alcohol use: Yes     Alcohol/week: 4.0 standard drinks     Types: 4 Glasses of wine per week     Comment: 4/WK    Drug use: No    Sexual activity: Not Currently     Family History   Problem Relation Age of Onset    No Known Problems Mother     Cancer Father 62        agent orange    Alzheimer's Disease Paternal Grandmother     Other Sister         FIBROMYALGIA, TRIGEMINAL NEUROLOGIA    Anesth Problems Neg Hx         No Known Allergies  Prior to Admission medications    Medication Sig Start Date End Date Taking? Authorizing Provider   naproxen (NAPROSYN) 500 mg tablet Take 500 mg by mouth two (2) times daily (with meals).    Yes Provider, Historical   ergocalciferol (Vitamin D2) 1,250 mcg (50,000 unit) capsule Take 50,000 Units by mouth every seven (7) days. Saturday   Yes Provider, Historical   nitroglycerin (NITRODUR) 0.2 mg/hr 0.25 Patches by TransDERmal route daily. APPLIED TO ANKLE   Yes Provider, Historical   omega 3-DHA-EPA-fish oil (Fish OiL) 1,000 mg (120 mg-180 mg) capsule Take 1 Capsule by mouth daily. Yes Provider, Historical   multivitamin (ONE A DAY) tablet Take 1 Tablet by mouth daily. Yes Provider, Historical   elderberry fruit (ELDERBERRY PO) Take 1 Tablet by mouth daily. Yes Provider, Historical   B-complex with vitamin C (VITAMIN B COMPLEX-C PO) Take 1 Tablet by mouth daily. Yes Provider, Historical   B.infantis-B.ani-B.long-B.bifi (Probiotic 4X) 10-15 mg TbEC Take 1 Tablet by mouth daily. Yes Provider, Historical        Review of Systems:  A comprehensive review of systems was negative except for that written in the History of Present Illness. Objective:     Vitals:    12/14/21 1052   Weight: 127 kg (280 lb)   Height: 5' (1.524 m)       Physical Exam:  Ab soft, nt, no masses  Ext nt    Assessment:     Active Problems:    * No active hospital problems. *     Fibroids with pelvic pain    Plan:     Procedure(s) (LRB):  DAVINCI LAPROSCOPIC SUPRACERVICAL HYSTERECTOMY, POSSIBLE TOTAL LAPAROSCOPY HYSTERECTOMY, BILATERAL SALPINGO-OOPHORECTOMY (N/A)  Discussed the risks of surgery including the risks of bleeding, infection, deep vein thrombosis, and surgical injuries to internal organs including but not limited to the bowels, bladder, rectum, and female reproductive organs. The patient understands the risks; any and all questions were answered to the patient's satisfaction.

## 2021-12-15 NOTE — DISCHARGE INSTRUCTIONS
Patient Education        Laparoscopic Hysterectomy: What to Expect at Home  Your Recovery     A laparoscopic hysterectomy is surgery to take out the uterus. Your doctor put a lighted tube and surgical tools through small cuts in your belly to remove the uterus. You can expect to feel better and stronger each day. But you might need pain medicine for a week or two. You may get tired easily or have less energy than usual. The tiredness may last for several weeks after surgery. You will probably notice that your belly is swollen and puffy. This is common. The swelling will take several weeks to go down. You may take about 4 to 6 weeks to fully recover. It's important to avoid lifting while you are recovering so that you can heal.  This care sheet gives you a general idea about how long it will take for you to recover. But each person recovers at a different pace. Follow the steps below to get better as quickly as possible. How can you care for yourself at home? Activity    · Rest when you feel tired.     · Be active. Walking is a good choice.     · Allow the area to heal. Don't move quickly or lift anything heavy until you are feeling better.     · You may shower 24 to 48 hours after surgery, if your doctor okays it. Pat the incision dry. Do not take a bath for the first 2 weeks, or until your doctor tells you it is okay.     · Ask your doctor when it is okay for you to have sex. Diet    · You can eat your normal diet. If your stomach is upset, try bland, low-fat foods like plain rice, broiled chicken, toast, and yogurt.     · If your bowel movements are not regular right after surgery, try to avoid constipation and straining. Drink plenty of water. Your doctor may suggest fiber, a stool softener, or a mild laxative. Medicines    · Your doctor will tell you if and when you can restart your medicines.  He or she will also give you instructions about taking any new medicines.     · If you take aspirin or some other blood thinner, ask your doctor if and when to start taking it again. Make sure that you understand exactly what your doctor wants you to do.     · Be safe with medicines. Read and follow all instructions on the label. ? If the doctor gave you a prescription medicine for pain, take it as prescribed. ? If you are not taking a prescription pain medicine, ask your doctor if you can take an over-the-counter medicine.     · If your doctor prescribed antibiotics, take them as directed. Do not stop taking them just because you feel better. You need to take the full course of antibiotics. Incision care    · You may have stitches over the cuts (incisions) the doctor made in your belly.     · If you have strips of tape on the cut (incision) the doctor made, leave the tape on for a week or until it falls off.     · Wash the area daily with warm, soapy water, and pat it dry. Don't use hydrogen peroxide or alcohol. They can slow healing.     · You may cover the area with a gauze bandage if it oozes fluid or rubs against clothing.     · Change the bandage every day. Other instructions    · You may have some light vaginal bleeding. Wear sanitary pads if needed. Do not douche or use tampons.     · Don't have sex until the doctor says it is okay. Follow-up care is a key part of your treatment and safety. Be sure to make and go to all appointments, and call your doctor if you are having problems. It's also a good idea to know your test results and keep a list of the medicines you take. When should you call for help? Call 911 anytime you think you may need emergency care. For example, call if:    · You passed out (lost consciousness).     · You have chest pain, are short of breath, or cough up blood.    Call your doctor now or seek immediate medical care if:    · You have pain that does not get better after you take pain medicine.     · You cannot pass stools or gas.     · You have vaginal discharge that has increased in amount or smells bad.     · You are sick to your stomach or cannot drink fluids.     · You have loose stitches, or your incision comes open.     · Bright red blood has soaked through the bandage over your incision.     · You have signs of infection, such as:  ? Increased pain, swelling, warmth, or redness. ? Red streaks leading from the incision. ? Pus draining from the incision. ? A fever.     · You have bright red vaginal bleeding that soaks one or more pads in an hour, or you have large clots.     · You have signs of a blood clot in your leg (called a deep vein thrombosis), such as:  ? Pain in your calf, back of the knee, thigh, or groin. ? Redness and swelling in your leg or groin. Watch closely for changes in your health, and be sure to contact your doctor if you have any problems. Where can you learn more? Go to http://www.gray.com/  Enter Q131 in the search box to learn more about \"Laparoscopic Hysterectomy: What to Expect at Home. \"  Current as of: February 11, 2021               Content Version: 13.0  © 2006-2021 [employer]. Care instructions adapted under license by [employer] (which disclaims liability or warranty for this information). If you have questions about a medical condition or this instruction, always ask your healthcare professional. [place] [address] any warranty or liability for your use of this information.

## 2021-12-15 NOTE — PROGRESS NOTES
Physical Therapy Screening:    [facility] therapy based on results obtained during the nursing admission assessment. The patients chart was reviewed and the patient is appropriate for a skilled therapy evaluation if there is a decline in functional mobility from baseline. Please order a consult for physical therapy if you are in agreement and would like an evaluation to be completed. Thank you.     Nancyann Cranker, PT

## 2021-12-15 NOTE — PERIOP NOTES
TRANSFER - OUT REPORT:    Verbal report given to Corwin (name) on Shirley Saldaña  being transferred to [phone] (unit) for routine post - op       Report consisted of patients Situation, Background, Assessment and   Recommendations(SBAR). Time Pre op antibiotic [id number] (Ancef) & 0900 (Flagyl)  Anesthesia Stop time: 1128  Hahn Present on Transfer to floor:Y  Order for Hahn on Chart:Y  Discharge Prescriptions with Chart:N/A    Information from the following report(s) SBAR, OR Summary, Procedure Summary, Intake/Output and MAR was reviewed with the receiving nurse. Opportunity for questions and clarification was provided. Is the patient on 02? YES       L/Min 2       Other N/A    Is the patient on a monitor? NO    Is the nurse transporting with the patient? NO    Surgical Waiting Area notified of patient's transfer from PACU? YES      The following personal items collected during your admission accompanied patient upon transfer:   Dental Appliance: Dental Appliances: None  Vision: Visual Aid: Glasses (READERS)  Hearing Aid:    Jewelry: Jewelry: None  Clothing: Clothing: Pants,Sweater,Shirt (labeled clothing bag to PACU) W/ pt to room 321  Other Valuables:  Other Valuables: None  Valuables sent to safe:

## 2021-12-15 NOTE — PERIOP NOTES
10 mL VistaSeal applied to intra-abdominal operative site at 10:47.     Lot [id number]    6gm Surgicel powder applied to intra-abdominal operative site at 10:54    Lot No: [id number]

## 2021-12-15 NOTE — ANESTHESIA POSTPROCEDURE EVALUATION
Procedure(s):  DAVINCI ASSISTED LAPROSCOPIC SUPRACERVICAL HYSTERECTOMY,WITH  BILATERAL SALPINGO-OOPHORECTOMY, EXTENSIVE LYSIS OF ADHESIONS, UTERUS GREATER THAN 250 GRAMS. general    Anesthesia Post Evaluation        Patient location during evaluation: PACU  Patient participation: complete - patient participated  Level of consciousness: awake and alert  Pain management: adequate  Airway patency: patent  Anesthetic complications: no  Cardiovascular status: acceptable  Respiratory status: acceptable  Hydration status: acceptable  Comments: I have seen and evaluated the patient and is ready for discharge. Patti Roldan MD    Post anesthesia nausea and vomiting:  none      INITIAL Post-op Vital signs:   Vitals Value Taken Time   /77 12/15/21 1300   Temp 36.5 °C (97.7 °F) 12/15/21 1215   Pulse 54 12/15/21 1306   Resp 11 12/15/21 1306   SpO2 99 % 12/15/21 1306   Vitals shown include unvalidated device data.

## 2021-12-16 LAB
ALBUMIN SERPL-MCNC: 2.8 G/DL (ref 3.5–5)
ALBUMIN/GLOB SERPL: 1 {RATIO} (ref 1.1–2.2)
ALP SERPL-CCNC: 57 U/L (ref 45–117)
ALT SERPL-CCNC: 18 U/L (ref 12–78)
ANION GAP SERPL CALC-SCNC: 8 MMOL/L (ref 5–15)
APTT PPP: 28.9 SEC (ref 22.1–31)
AST SERPL-CCNC: 14 U/L (ref 15–37)
BASOPHILS # BLD: 0 K/UL (ref 0–0.1)
BASOPHILS NFR BLD: 0 % (ref 0–1)
BILIRUB SERPL-MCNC: 0.8 MG/DL (ref 0.2–1)
BUN SERPL-MCNC: 14 MG/DL (ref 6–20)
BUN/CREAT SERPL: 15 (ref 12–20)
CALCIUM SERPL-MCNC: 8.3 MG/DL (ref 8.5–10.1)
CHLORIDE SERPL-SCNC: 104 MMOL/L (ref 97–108)
CO2 SERPL-SCNC: 21 MMOL/L (ref 21–32)
CREAT SERPL-MCNC: 0.95 MG/DL (ref 0.55–1.02)
DIFFERENTIAL METHOD BLD: ABNORMAL
EOSINOPHIL # BLD: 0 K/UL (ref 0–0.4)
EOSINOPHIL NFR BLD: 0 % (ref 0–7)
ERYTHROCYTE [DISTWIDTH] IN BLOOD BY AUTOMATED COUNT: 12.6 % (ref 11.5–14.5)
ERYTHROCYTE [DISTWIDTH] IN BLOOD BY AUTOMATED COUNT: 12.8 % (ref 11.5–14.5)
ERYTHROCYTE [DISTWIDTH] IN BLOOD BY AUTOMATED COUNT: 13.1 % (ref 11.5–14.5)
FIBRINOGEN PPP-MCNC: 448 MG/DL (ref 200–475)
GLOBULIN SER CALC-MCNC: 2.7 G/DL (ref 2–4)
GLUCOSE SERPL-MCNC: 132 MG/DL (ref 65–100)
HCT VFR BLD AUTO: 23 % (ref 35–47)
HCT VFR BLD AUTO: 23.2 % (ref 35–47)
HCT VFR BLD AUTO: 27.4 % (ref 35–47)
HGB BLD-MCNC: 7.7 G/DL (ref 11.5–16)
HGB BLD-MCNC: 7.9 G/DL (ref 11.5–16)
HGB BLD-MCNC: 8.9 G/DL (ref 11.5–16)
HISTORY CHECKED?,CKHIST: NORMAL
IMM GRANULOCYTES # BLD AUTO: 0 K/UL (ref 0–0.04)
IMM GRANULOCYTES NFR BLD AUTO: 0 % (ref 0–0.5)
IMM GRANULOCYTES NFR BLD AUTO: 0 % (ref 0–0.5)
IMM GRANULOCYTES NFR BLD AUTO: 1 % (ref 0–0.5)
INR PPP: 1 (ref 0.9–1.1)
LYMPHOCYTES # BLD: 0.9 K/UL (ref 0.8–3.5)
LYMPHOCYTES # BLD: 1.1 K/UL (ref 0.8–3.5)
LYMPHOCYTES # BLD: 1.3 K/UL (ref 0.8–3.5)
LYMPHOCYTES NFR BLD: 13 % (ref 12–49)
LYMPHOCYTES NFR BLD: 17 % (ref 12–49)
LYMPHOCYTES NFR BLD: 22 % (ref 12–49)
MCH RBC QN AUTO: 30.3 PG (ref 26–34)
MCH RBC QN AUTO: 30.3 PG (ref 26–34)
MCH RBC QN AUTO: 30.6 PG (ref 26–34)
MCHC RBC AUTO-ENTMCNC: 32.5 G/DL (ref 30–36.5)
MCHC RBC AUTO-ENTMCNC: 33.2 G/DL (ref 30–36.5)
MCHC RBC AUTO-ENTMCNC: 34.3 G/DL (ref 30–36.5)
MCV RBC AUTO: 89.1 FL (ref 80–99)
MCV RBC AUTO: 91.3 FL (ref 80–99)
MCV RBC AUTO: 93.2 FL (ref 80–99)
MONOCYTES # BLD: 1 K/UL (ref 0–1)
MONOCYTES # BLD: 1 K/UL (ref 0–1)
MONOCYTES # BLD: 1.1 K/UL (ref 0–1)
MONOCYTES NFR BLD: 15 % (ref 5–13)
MONOCYTES NFR BLD: 15 % (ref 5–13)
MONOCYTES NFR BLD: 19 % (ref 5–13)
NEUTS SEG # BLD: 3.5 K/UL (ref 1.8–8)
NEUTS SEG # BLD: 4.4 K/UL (ref 1.8–8)
NEUTS SEG # BLD: 4.9 K/UL (ref 1.8–8)
NEUTS SEG NFR BLD: 59 % (ref 32–75)
NEUTS SEG NFR BLD: 67 % (ref 32–75)
NEUTS SEG NFR BLD: 72 % (ref 32–75)
NRBC # BLD: 0 K/UL (ref 0–0.01)
NRBC BLD-RTO: 0 PER 100 WBC
PLATELET # BLD AUTO: 184 K/UL (ref 150–400)
PLATELET # BLD AUTO: 200 K/UL (ref 150–400)
PLATELET # BLD AUTO: 219 K/UL (ref 150–400)
PMV BLD AUTO: 11.3 FL (ref 8.9–12.9)
PMV BLD AUTO: 11.5 FL (ref 8.9–12.9)
PMV BLD AUTO: 11.9 FL (ref 8.9–12.9)
POTASSIUM SERPL-SCNC: 4.9 MMOL/L (ref 3.5–5.1)
PROT SERPL-MCNC: 5.5 G/DL (ref 6.4–8.2)
PROTHROMBIN TIME: 10.5 SEC (ref 9–11.1)
RBC # BLD AUTO: 2.54 M/UL (ref 3.8–5.2)
RBC # BLD AUTO: 2.58 M/UL (ref 3.8–5.2)
RBC # BLD AUTO: 2.94 M/UL (ref 3.8–5.2)
SODIUM SERPL-SCNC: 133 MMOL/L (ref 136–145)
THERAPEUTIC RANGE,PTTT: NORMAL SECS (ref 58–77)
WBC # BLD AUTO: 6 K/UL (ref 3.6–11)
WBC # BLD AUTO: 6.6 K/UL (ref 3.6–11)
WBC # BLD AUTO: 6.9 K/UL (ref 3.6–11)

## 2021-12-16 PROCEDURE — 80053 COMPREHEN METABOLIC PANEL: CPT

## 2021-12-16 PROCEDURE — G0378 HOSPITAL OBSERVATION PER HR: HCPCS

## 2021-12-16 PROCEDURE — 74011000258 HC RX REV CODE- 258: Performed by: SPECIALIST

## 2021-12-16 PROCEDURE — 74011250636 HC RX REV CODE- 250/636: Performed by: SPECIALIST

## 2021-12-16 PROCEDURE — 96374 THER/PROPH/DIAG INJ IV PUSH: CPT

## 2021-12-16 PROCEDURE — 36415 COLL VENOUS BLD VENIPUNCTURE: CPT

## 2021-12-16 PROCEDURE — 96376 TX/PRO/DX INJ SAME DRUG ADON: CPT

## 2021-12-16 PROCEDURE — 86901 BLOOD TYPING SEROLOGIC RH(D): CPT

## 2021-12-16 PROCEDURE — 86923 COMPATIBILITY TEST ELECTRIC: CPT

## 2021-12-16 PROCEDURE — 85610 PROTHROMBIN TIME: CPT

## 2021-12-16 PROCEDURE — 85730 THROMBOPLASTIN TIME PARTIAL: CPT

## 2021-12-16 PROCEDURE — 74011250637 HC RX REV CODE- 250/637: Performed by: SPECIALIST

## 2021-12-16 PROCEDURE — 96375 TX/PRO/DX INJ NEW DRUG ADDON: CPT

## 2021-12-16 PROCEDURE — 85384 FIBRINOGEN ACTIVITY: CPT

## 2021-12-16 PROCEDURE — 74011250636 HC RX REV CODE- 250/636: Performed by: OBSTETRICS & GYNECOLOGY

## 2021-12-16 PROCEDURE — 85025 COMPLETE CBC W/AUTO DIFF WBC: CPT

## 2021-12-16 RX ORDER — FUROSEMIDE 10 MG/ML
10 INJECTION INTRAMUSCULAR; INTRAVENOUS ONCE
Status: COMPLETED | OUTPATIENT
Start: 2021-12-16 | End: 2021-12-16

## 2021-12-16 RX ORDER — SODIUM CHLORIDE 9 MG/ML
250 INJECTION, SOLUTION INTRAVENOUS AS NEEDED
Status: DISCONTINUED | OUTPATIENT
Start: 2021-12-16 | End: 2021-12-24 | Stop reason: HOSPADM

## 2021-12-16 RX ORDER — FACIAL-BODY WIPES
10 EACH TOPICAL DAILY PRN
Status: DISCONTINUED | OUTPATIENT
Start: 2021-12-16 | End: 2021-12-24 | Stop reason: HOSPADM

## 2021-12-16 RX ORDER — DOCUSATE SODIUM 100 MG/1
100 CAPSULE, LIQUID FILLED ORAL 2 TIMES DAILY
Status: DISCONTINUED | OUTPATIENT
Start: 2021-12-16 | End: 2021-12-24 | Stop reason: HOSPADM

## 2021-12-16 RX ADMIN — Medication 80 MG: at 18:11

## 2021-12-16 RX ADMIN — FUROSEMIDE 10 MG: 10 INJECTION, SOLUTION INTRAMUSCULAR; INTRAVENOUS at 15:00

## 2021-12-16 RX ADMIN — PIPERACILLIN SODIUM AND TAZOBACTAM SODIUM 3.38 G: 3; .375 INJECTION, POWDER, LYOPHILIZED, FOR SOLUTION INTRAVENOUS at 20:02

## 2021-12-16 RX ADMIN — HYDROMORPHONE HYDROCHLORIDE 1 MG: 1 INJECTION, SOLUTION INTRAMUSCULAR; INTRAVENOUS; SUBCUTANEOUS at 20:02

## 2021-12-16 RX ADMIN — Medication 10 ML: at 22:00

## 2021-12-16 RX ADMIN — SODIUM CHLORIDE, POTASSIUM CHLORIDE, SODIUM LACTATE AND CALCIUM CHLORIDE 150 ML/HR: 600; 310; 30; 20 INJECTION, SOLUTION INTRAVENOUS at 03:26

## 2021-12-16 RX ADMIN — ONDANSETRON 4 MG: 2 INJECTION INTRAMUSCULAR; INTRAVENOUS at 20:07

## 2021-12-16 RX ADMIN — HYDROMORPHONE HYDROCHLORIDE 1 MG: 1 INJECTION, SOLUTION INTRAMUSCULAR; INTRAVENOUS; SUBCUTANEOUS at 16:08

## 2021-12-16 RX ADMIN — PIPERACILLIN SODIUM AND TAZOBACTAM SODIUM 3.38 G: 3; .375 INJECTION, POWDER, LYOPHILIZED, FOR SOLUTION INTRAVENOUS at 12:35

## 2021-12-16 RX ADMIN — DOCUSATE SODIUM 100 MG: 100 CAPSULE ORAL at 18:10

## 2021-12-16 RX ADMIN — Medication 80 MG: at 13:12

## 2021-12-16 RX ADMIN — HYDROMORPHONE HYDROCHLORIDE 1 MG: 1 INJECTION, SOLUTION INTRAMUSCULAR; INTRAVENOUS; SUBCUTANEOUS at 12:32

## 2021-12-16 RX ADMIN — ONDANSETRON 4 MG: 2 INJECTION INTRAMUSCULAR; INTRAVENOUS at 09:57

## 2021-12-16 RX ADMIN — OXYCODONE AND ACETAMINOPHEN 1 TABLET: 5; 325 TABLET ORAL at 03:29

## 2021-12-16 RX ADMIN — DOCUSATE SODIUM 100 MG: 100 CAPSULE ORAL at 12:35

## 2021-12-16 RX ADMIN — SODIUM CHLORIDE, POTASSIUM CHLORIDE, SODIUM LACTATE AND CALCIUM CHLORIDE 500 ML: 600; 310; 30; 20 INJECTION, SOLUTION INTRAVENOUS at 04:30

## 2021-12-16 RX ADMIN — HYDROMORPHONE HYDROCHLORIDE 1 MG: 1 INJECTION, SOLUTION INTRAMUSCULAR; INTRAVENOUS; SUBCUTANEOUS at 07:42

## 2021-12-16 RX ADMIN — SODIUM CHLORIDE, POTASSIUM CHLORIDE, SODIUM LACTATE AND CALCIUM CHLORIDE 150 ML/HR: 600; 310; 30; 20 INJECTION, SOLUTION INTRAVENOUS at 16:37

## 2021-12-16 RX ADMIN — SODIUM CHLORIDE, POTASSIUM CHLORIDE, SODIUM LACTATE AND CALCIUM CHLORIDE 1000 ML: 600; 310; 30; 20 INJECTION, SOLUTION INTRAVENOUS at 08:06

## 2021-12-16 RX ADMIN — OXYCODONE AND ACETAMINOPHEN 1 TABLET: 5; 325 TABLET ORAL at 09:51

## 2021-12-16 NOTE — PROGRESS NOTES
Day #1 of Zosyn  Indication:  OB/GYN infection  Current regimen:  3.375 GM IV Q6H    Recent Labs     21  1002 21  0347   WBC 6.0 6.9   CREA  --  0.95   BUN  --  14     Est CrCl: ~ 85 ml/min; Temp (24hrs), Av °F (36.7 °C), Min:97.3 °F (36.3 °C), Max:99.2 °F (37.3 °C)    Plan: Change to Zosyn 3.375 GM IV Q8H for CrCl > 20 ml/min per hospital renal dosing protocol.

## 2021-12-16 NOTE — PROGRESS NOTES
Dr. Ino Cardona call for update. MD made aware that pt's bruising has slightly increased to right lower abd and pt's output and vital signs.  Orders recieved

## 2021-12-16 NOTE — OP NOTES
[address]   OPERATIVE REPORT    Name:  Senait Castellanos  MR#:  [id number]  :  1970  ACCOUNT #:  [de-identified]  DATE OF SERVICE:  12/15/2021    PREOPERATIVE DIAGNOSIS:       POSTOPERATIVE DIAGNOSIS:       PROCEDURES PERFORMED:  Sung Palacios laparoscopic supracervical hysterectomy, bilateral salpingo-oophorectomy, extensive lysis of adhesions. SURGEON:  Gaston Dumont MD    ASSISTANT:  Almita Dunlap MD    ANESTHESIA:  General.    COMPLICATIONS:  None. SPECIMENS REMOVED:  Pathology:  Morcellated uterus, bilateral ovaries and tubes. Uterus greater than 250 g. IMPLANTS:       ESTIMATED BLOOD LOSS:  Less than 500 mL. FINDINGS:  Uterus approximately 18 weeks in size with extensive small bowel and sigmoid adhesions, right endometrioma 4 x 4 cm, left sidewall and small bowel adhesions to the left ovary and tube. BARRERA CATHETER:  Clear urine. PROCEDURE:  After extensive counseling risks and benefits of the procedure, complication rates of the procedure, as well as careful discussion with the patient in regard to supracervical hysterectomy versus total hysterectomy and morcellation through an Endo Bag, consents were signed. She was taken to the operating room and after adequate anesthesia, preoperative antibiotics, SCDs, Lovenox, and a bowel prep, the patient was placed in the dorsal lithotomy position, prepped and draped in the usual sterile manner for the surgical procedure. Barrera catheter was placed. Clear urine was noted. Sterile Graves speculum was inserted in the vagina. Anterior lip of the cervix was grasped with a single-tooth tenaculum and a da Jovita diagnostic uterine manipulator was placed through the cervical canal for means of uterine manipulation.   The single-tooth tenaculum and Graves speculum were removed, and attention was turned to the abdomen where a Veress needle was placed infraumbilically and confirmed with a water drop test.  Insufflation of CO2 up to 15 mmHg was then performed without complication. A #11 scalpel blade was then used to make an infraumbilical incision. A bladeless 8 mm trocar and sleeve was inserted infraumbilically with the above noted findings. 10 cm bilateral to the umbilical port site, 8 mm bladeless trocars and sleeves were inserted as well as a 10/12 in the right lower quadrant. She was placed in steep Trendelenburg. The [employer] robot was docked under the proper docking technique, fenestrated bipolar in the left hand, hot regino in the right hand. After approximately 1 hour of extensive adhesiolysis with the above noted findings, a 5 mm bladeless trocar and sleeve was inserted in the left lower quadrant for irrigation and hydrodissection. There was noted to be a bilateral broad ligament fibroid. It was decided at this point after adhesiolysis, with the fenestrated bipolar coagulate the round ligaments and bladder flap was created with the use of the hot regino. After extensive dissection, clearly the right uterine vessel was visualized, which were coagulated with the fenestrated bipolar and transected with hot regino, and it was decided because of the adhesions of the sigmoid colon posteriorly and adhesiolysis to go from anterior to posterior, so right at the uterosacral ligament line, the uterus was transected from the cervix and peeled away from the small bowel. Upon completion, the endocervical canal was coagulated. The uterus was then placed into an Endo Bag and the uterus was morcellated under direct visualization without complication. Bilateral sidewalls were clearly visualized with the ureters. There was no other appreciable complication. There was some omental adhesiolysis which was noted and small bleeders which were coagulated with a fenestrated bipolar. Upon completion, VistaSeal was sprayed over the sidewalls as well as the cul-de-sac. Surgi powder was then sprayed.   Upon completion, no active bleeding noted.  Needle, sponge, and instrument counts were correct x2 at the end of the procedure. The [employer] robot was then undocked under the proper undocking technique. 10/12 sites were closed with 0 Vicryl interrupted x2 and 8 mm site with 3-0 Vicryl interrupted x1. Dermabond was used on the skin. 0.25% Marcaine without was injected subcutaneously. The patient was awoken in the operating room, taken to Recovery in stable condition.       MD FEMI Espinal/[email or web address]  D:  12/15/2021 12:02  T:  12/15/2021 19:15  JOB #:  [id number]

## 2021-12-16 NOTE — PROGRESS NOTES
Observation notice provided in writing to patient and/or caregiver as well as verbal explanation of the policy. Patients who are in outpatient status also receive the Observation notice. Patient has received notice and or patient representative has received via secure email, fax, or certified mail based on patient representative's preference.      Rita García, MS

## 2021-12-16 NOTE — PROGRESS NOTES
Called to see pt for decreased urine output and abdominal bruising  S: +headache, +ab pain and lower ab pressure, tolerating po  bp's /57-63, p88 to 95  Ab softly dist with +tend, no zane or guard  +ecchymosis around trocar sites, especially RLQ  RLQ trocar site tender with bruising tracking around right flank  Ext nt, mild edema    Hb 7.7    A/p decreased uop and mild hypotension from ab wall hematoma and surgical bloodloss  Continue crystalloids  pRBC transfusion x 2units (pending blood bank approval)  Start abx  Repeat labs 6h after tx

## 2021-12-16 NOTE — PROGRESS NOTES
Dr. Juan Escobar called to check on pt. MD updated on pt's condition and labs.  No new orders recieved

## 2021-12-16 NOTE — PROGRESS NOTES
After receiving a bolus today at 0806am and iv fluids infusing at 150cc/hr and taking in fluids. Pt ouput only 50cc scanned bladder showed less than 23 cc. Pt bruising to incision sites have remain the same but bruising to the lower right quad has increased no vag bleeding noted . Pt c/o some pressure abd soft no n/v noted. Vital signs 113/57 , 94 pulse , 95 on room air. Called Dr. Glory Yeager about her lab work hgb was 7.7, vitals signs , output, and the increase in bruising. No new orders MD said someone will come and see pt.

## 2021-12-16 NOTE — PROGRESS NOTES
Cbc results came back hgb up to 7.9 up from 7.7 no increase in bruising at right lower abd and also no increases bruising at incision sites. No vag bleeding noted all shift. Vss. Blood bank said per policy pt was over seven will not receive blood at this time pt was 7.9. Dr. Edith Terrell called updated on pt's condition, output and lab results for this evening. MD aware that pt was above seven blood being held. No new orders. MD said would be in very earlier tomorrow to see pt.

## 2021-12-16 NOTE — PROGRESS NOTES
Bedside shift change report given to Wendy Gleason RN and Eligio Ocampo RN (oncoming nurse) by Anna Cheung RN (offgoing nurse). Report included the following information SBAR. On call service called and requested call back from a provider. [address] called back and updated about patient's decreased urinary output. Since noon she has only put out 120mL total. Ike Tad asked to put in order 20mg IV lasix once under Dr. Stefany Huffman. [address] [place] [address] called unit and asked to hold 20mg Iv lasix and give 1000ml LR bolus instead. 2946 On call service called and requested call back from a provider. 0422 Afshan Mikaela returned call and made aware patient urine output only 125 ml since bolus at 2200. Pt lap sites had bruising on assessment bruising is worse on reassessment. Plan to bolus with 500 ml of LR and keep rodgers in.

## 2021-12-16 NOTE — PROGRESS NOTES
Gynecology Progress Note    Patient doing well post-op day 1 from Procedure(s):  DAVINCI ASSISTED LAPROSCOPIC SUPRACERVICAL HYSTERECTOMY,WITH  BILATERAL SALPINGO-OOPHORECTOMY, EXTENSIVE LYSIS OF ADHESIONS, UTERUS GREATER THAN 250 GRAMS without significant complaints. Pain uncontrolled on current medication presently. Hahn in place. Patient is not passing flatus. Vitals:  Blood pressure 99/63, pulse 95, temperature 98.6 °F (37 °C), resp. rate 16, height 5' (1.524 m), weight 127 kg (280 lb), SpO2 94 %. Temp (24hrs), Av.9 °F (36.6 °C), Min:97 °F (36.1 °C), Max:99.2 °F (37.3 °C)        Exam:  Patient without distress. Abdomen soft,  nontender. Incision dry and clean without erythema. Lower extremities are negative for swelling, cords, or tenderness.     Lab/Data Review:  BMP:   Lab Results   Component Value Date/Time     (L) 2021 03:47 AM    K 4.9 2021 03:47 AM     2021 03:47 AM    CO2 21 2021 03:47 AM    AGAP 8 2021 03:47 AM     (H) 2021 03:47 AM    BUN 14 2021 03:47 AM    CREA 0.95 2021 03:47 AM    GFRAA >60 2021 03:47 AM    GFRNA >60 2021 03:47 AM     CMP:   Lab Results   Component Value Date/Time     (L) 2021 03:47 AM    K 4.9 2021 03:47 AM     2021 03:47 AM    CO2 21 2021 03:47 AM    AGAP 8 2021 03:47 AM     (H) 2021 03:47 AM    BUN 14 2021 03:47 AM    CREA 0.95 2021 03:47 AM    GFRAA >60 2021 03:47 AM    GFRNA >60 2021 03:47 AM    CA 8.3 (L) 2021 03:47 AM    ALB 2.8 (L) 2021 03:47 AM    TP 5.5 (L) 2021 03:47 AM    GLOB 2.7 2021 03:47 AM    AGRAT 1.0 (L) 2021 03:47 AM    ALT 18 2021 03:47 AM     CBC:   Lab Results   Component Value Date/Time    WBC 6.9 2021 03:47 AM    HGB 8.9 (L) 2021 03:47 AM    HCT 27.4 (L) 2021 03:47 AM     2021 03:47 AM       Assessment and Plan:  Patient appears to be having uncomplicated post Procedure(s):  DAVINCI ASSISTED LAPROSCOPIC SUPRACERVICAL HYSTERECTOMY,WITH  BILATERAL SALPINGO-OOPHORECTOMY, EXTENSIVE LYSIS OF ADHESIONS, UTERUS GREATER THAN 250 GRAMS course. Continue routine post-op care. 1.  Pt with only 20 cc of concentrated urine per hour. 1 Liter fluid bolus of LR. Hemoglobin 8.9 will repeat in 6 hours if urine out put is not appropriate. Continue rodgers for now. D/C when urine output is appropriate  2. Pain medication is due will attempt to control pts pain  3. Pt encouraged to ambulate; this may also improve urine output and encourage flatus  4. Unable to presently recommend discharge until urine output is appropriate / rodgers removed with spontaneous void/ ambulatory/ adequate pain control achieved.   5.  All questions answered

## 2021-12-17 LAB
ALBUMIN SERPL-MCNC: 2.6 G/DL (ref 3.5–5)
ALBUMIN/GLOB SERPL: 0.8 {RATIO} (ref 1.1–2.2)
ALP SERPL-CCNC: 60 U/L (ref 45–117)
ALT SERPL-CCNC: 12 U/L (ref 12–78)
ANION GAP SERPL CALC-SCNC: 7 MMOL/L (ref 5–15)
AST SERPL-CCNC: 20 U/L (ref 15–37)
BASOPHILS # BLD: 0 K/UL (ref 0–0.1)
BASOPHILS NFR BLD: 0 % (ref 0–1)
BILIRUB SERPL-MCNC: 1.2 MG/DL (ref 0.2–1)
BUN SERPL-MCNC: 12 MG/DL (ref 6–20)
BUN/CREAT SERPL: 11 (ref 12–20)
CALCIUM SERPL-MCNC: 8.5 MG/DL (ref 8.5–10.1)
CHLORIDE SERPL-SCNC: 97 MMOL/L (ref 97–108)
CO2 SERPL-SCNC: 22 MMOL/L (ref 21–32)
CREAT SERPL-MCNC: 1.06 MG/DL (ref 0.55–1.02)
DIFFERENTIAL METHOD BLD: ABNORMAL
EOSINOPHIL # BLD: 0 K/UL (ref 0–0.4)
EOSINOPHIL NFR BLD: 0 % (ref 0–7)
ERYTHROCYTE [DISTWIDTH] IN BLOOD BY AUTOMATED COUNT: 13.1 % (ref 11.5–14.5)
GLOBULIN SER CALC-MCNC: 3.4 G/DL (ref 2–4)
GLUCOSE SERPL-MCNC: 131 MG/DL (ref 65–100)
HCT VFR BLD AUTO: 21.7 % (ref 35–47)
HGB BLD-MCNC: 7.4 G/DL (ref 11.5–16)
IMM GRANULOCYTES # BLD AUTO: 0 K/UL (ref 0–0.04)
IMM GRANULOCYTES NFR BLD AUTO: 0 % (ref 0–0.5)
LYMPHOCYTES # BLD: 1.1 K/UL (ref 0.8–3.5)
LYMPHOCYTES NFR BLD: 13 % (ref 12–49)
MCH RBC QN AUTO: 30.8 PG (ref 26–34)
MCHC RBC AUTO-ENTMCNC: 34.1 G/DL (ref 30–36.5)
MCV RBC AUTO: 90.4 FL (ref 80–99)
MONOCYTES # BLD: 1.3 K/UL (ref 0–1)
MONOCYTES NFR BLD: 15 % (ref 5–13)
NEUTS SEG # BLD: 6.1 K/UL (ref 1.8–8)
NEUTS SEG NFR BLD: 72 % (ref 32–75)
NRBC # BLD: 0 K/UL (ref 0–0.01)
NRBC BLD-RTO: 0 PER 100 WBC
PLATELET # BLD AUTO: 186 K/UL (ref 150–400)
PMV BLD AUTO: 11.6 FL (ref 8.9–12.9)
POTASSIUM SERPL-SCNC: 4.7 MMOL/L (ref 3.5–5.1)
PROT SERPL-MCNC: 6 G/DL (ref 6.4–8.2)
RBC # BLD AUTO: 2.4 M/UL (ref 3.8–5.2)
SODIUM SERPL-SCNC: 126 MMOL/L (ref 136–145)
WBC # BLD AUTO: 8.5 K/UL (ref 3.6–11)

## 2021-12-17 PROCEDURE — 85025 COMPLETE CBC W/AUTO DIFF WBC: CPT

## 2021-12-17 PROCEDURE — G0378 HOSPITAL OBSERVATION PER HR: HCPCS

## 2021-12-17 PROCEDURE — 74011250636 HC RX REV CODE- 250/636: Performed by: SPECIALIST

## 2021-12-17 PROCEDURE — 74011250637 HC RX REV CODE- 250/637: Performed by: SPECIALIST

## 2021-12-17 PROCEDURE — 36415 COLL VENOUS BLD VENIPUNCTURE: CPT

## 2021-12-17 PROCEDURE — 80053 COMPREHEN METABOLIC PANEL: CPT

## 2021-12-17 PROCEDURE — 74011000258 HC RX REV CODE- 258: Performed by: SPECIALIST

## 2021-12-17 PROCEDURE — 96376 TX/PRO/DX INJ SAME DRUG ADON: CPT

## 2021-12-17 RX ORDER — PANTOPRAZOLE SODIUM 40 MG/1
40 TABLET, DELAYED RELEASE ORAL
Status: DISCONTINUED | OUTPATIENT
Start: 2021-12-17 | End: 2021-12-21

## 2021-12-17 RX ADMIN — PIPERACILLIN SODIUM AND TAZOBACTAM SODIUM 3.38 G: 3; .375 INJECTION, POWDER, LYOPHILIZED, FOR SOLUTION INTRAVENOUS at 05:13

## 2021-12-17 RX ADMIN — PANTOPRAZOLE SODIUM 40 MG: 40 TABLET, DELAYED RELEASE ORAL at 07:32

## 2021-12-17 RX ADMIN — Medication 10 ML: at 06:00

## 2021-12-17 RX ADMIN — ONDANSETRON 4 MG: 2 INJECTION INTRAMUSCULAR; INTRAVENOUS at 10:09

## 2021-12-17 RX ADMIN — HYDROMORPHONE HYDROCHLORIDE 1 MG: 1 INJECTION, SOLUTION INTRAMUSCULAR; INTRAVENOUS; SUBCUTANEOUS at 18:28

## 2021-12-17 RX ADMIN — HYDROMORPHONE HYDROCHLORIDE 1 MG: 1 INJECTION, SOLUTION INTRAMUSCULAR; INTRAVENOUS; SUBCUTANEOUS at 05:14

## 2021-12-17 RX ADMIN — DOCUSATE SODIUM 100 MG: 100 CAPSULE ORAL at 18:21

## 2021-12-17 RX ADMIN — PIPERACILLIN SODIUM AND TAZOBACTAM SODIUM 3.38 G: 3; .375 INJECTION, POWDER, LYOPHILIZED, FOR SOLUTION INTRAVENOUS at 17:50

## 2021-12-17 RX ADMIN — Medication 80 MG: at 21:30

## 2021-12-17 RX ADMIN — HYDROMORPHONE HYDROCHLORIDE 1 MG: 1 INJECTION, SOLUTION INTRAMUSCULAR; INTRAVENOUS; SUBCUTANEOUS at 00:30

## 2021-12-17 RX ADMIN — DOCUSATE SODIUM 100 MG: 100 CAPSULE ORAL at 09:57

## 2021-12-17 RX ADMIN — ONDANSETRON 4 MG: 2 INJECTION INTRAMUSCULAR; INTRAVENOUS at 05:30

## 2021-12-17 RX ADMIN — BISACODYL 10 MG: 10 SUPPOSITORY RECTAL at 18:21

## 2021-12-17 RX ADMIN — OXYCODONE AND ACETAMINOPHEN 1 TABLET: 5; 325 TABLET ORAL at 21:30

## 2021-12-17 RX ADMIN — Medication 80 MG: at 05:28

## 2021-12-17 RX ADMIN — HYDROMORPHONE HYDROCHLORIDE 1 MG: 1 INJECTION, SOLUTION INTRAMUSCULAR; INTRAVENOUS; SUBCUTANEOUS at 09:58

## 2021-12-17 RX ADMIN — ONDANSETRON 4 MG: 2 INJECTION INTRAMUSCULAR; INTRAVENOUS at 18:28

## 2021-12-17 RX ADMIN — HYDROMORPHONE HYDROCHLORIDE 1 MG: 1 INJECTION, SOLUTION INTRAMUSCULAR; INTRAVENOUS; SUBCUTANEOUS at 14:32

## 2021-12-17 NOTE — PROGRESS NOTES
Gynecology Post Operative Note    Maggi Beaver    Post-op day 2 from Procedure(s):  DAVINCI ASSISTED LAPROSCOPIC SUPRACERVICAL HYSTERECTOMY,WITH  BILATERAL SALPINGO-OOPHORECTOMY, EXTENSIVE LYSIS OF ADHESIONS, UTERUS GREATER THAN 250 GRAMS  She is without significant complaints. Pain controlled on current medication. Voiding without difficulty. Patient is not passing flatus. She is is tolerating her diet. Vitals:  Temp (24hrs), Av.7 °F (37.1 °C), Min:97.6 °F (36.4 °C), Max:99.8 °F (37.7 °C)     Visit Vitals  /63 (BP 1 Location: Left upper arm, BP Patient Position: At rest)   Pulse (!) 106   Temp 98.9 °F (37.2 °C)   Resp 16   Ht 5' (1.524 m)   Wt 127 kg (280 lb)   SpO2 97%   BMI 54.68 kg/m²           Intake and Output:   Current shift: No intake/output data recorded. Last 3 completed shifts: 12/15 1901 -  0700  In: -   Out: 3299 [Urine:1475]      Exam:  Patient without distress. Lungs clear              Abdomen soft, bowel sounds present, expected tenderness. Incision dry and clean without erythema. Lower extremities are negative for swelling, cords, or tenderness.     Labs:   Lab Results   Component Value Date/Time    WBC 8.5 2021 05:18 AM    WBC 6.6 2021 05:30 PM    WBC 6.0 2021 10:02 AM    WBC 6.9 2021 03:47 AM    WBC 6.3 2019 04:13 PM    WBC 5.9 2018 11:16 AM    HGB 7.4 (L) 2021 05:18 AM    HGB 7.9 (L) 2021 05:30 PM    HGB 7.7 (L) 2021 10:02 AM    HGB 8.9 (L) 2021 03:47 AM    HGB 13.2 2019 04:13 PM    HGB 13.4 2018 11:16 AM    HCT 21.7 (L) 2021 05:18 AM    HCT 23.0 (L) 2021 05:30 PM    HCT 23.2 (L) 2021 10:02 AM    HCT 27.4 (L) 2021 03:47 AM    HCT 39.3 2019 04:13 PM    HCT 41.2 2018 11:16 AM    PLATELET 284 42/15/5831 05:18 AM    PLATELET 349  05:30 PM    PLATELET 264  10:02 AM    PLATELET 473  03:47 AM    PLATELET 399 05/30/2019 04:13 PM    PLATELET 529 97/98/8007 11:16 AM       Recent Results (from the past 24 hour(s))   CBC WITH AUTOMATED DIFF    Collection Time: 12/16/21 10:02 AM   Result Value Ref Range    WBC 6.0 3.6 - 11.0 K/uL    RBC 2.54 (L) 3.80 - 5.20 M/uL    HGB 7.7 (L) 11.5 - 16.0 g/dL    HCT 23.2 (L) 35.0 - 47.0 %    MCV 91.3 80.0 - 99.0 FL    MCH 30.3 26.0 - 34.0 PG    MCHC 33.2 30.0 - 36.5 g/dL    RDW 12.8 11.5 - 14.5 %    PLATELET 064 933 - 626 K/uL    MPV 11.5 8.9 - 12.9 FL    NRBC 0.0 0  WBC    ABSOLUTE NRBC 0.00 0.00 - 0.01 K/uL    NEUTROPHILS 59 32 - 75 %    LYMPHOCYTES 22 12 - 49 %    MONOCYTES 19 (H) 5 - 13 %    EOSINOPHILS 0 0 - 7 %    BASOPHILS 0 0 - 1 %    IMMATURE GRANULOCYTES 0 0.0 - 0.5 %    ABS. NEUTROPHILS 3.5 1.8 - 8.0 K/UL    ABS. LYMPHOCYTES 1.3 0.8 - 3.5 K/UL    ABS. MONOCYTES 1.1 (H) 0.0 - 1.0 K/UL    ABS. EOSINOPHILS 0.0 0.0 - 0.4 K/UL    ABS. BASOPHILS 0.0 0.0 - 0.1 K/UL    ABS. IMM. GRANS. 0.0 0.00 - 0.04 K/UL    DF AUTOMATED     RBC, ALLOCATE    Collection Time: 12/16/21 12:15 PM   Result Value Ref Range    HISTORY CHECKED?  Historical check performed    TYPE & SCREEN    Collection Time: 12/16/21 12:39 PM   Result Value Ref Range    Crossmatch Expiration 12/19/2021,2359     ABO/Rh(D) Mitchael Asa POSITIVE     Antibody screen NEG    PROTHROMBIN TIME + INR    Collection Time: 12/16/21 12:44 PM   Result Value Ref Range    INR 1.0 0.9 - 1.1      Prothrombin time 10.5 9.0 - 11.1 sec   PTT    Collection Time: 12/16/21 12:44 PM   Result Value Ref Range    aPTT 28.9 22.1 - 31.0 sec    aPTT, therapeutic range     58.0 - 77.0 SECS   FIBRINOGEN    Collection Time: 12/16/21 12:44 PM   Result Value Ref Range    Fibrinogen 448 200 - 475 mg/dL   CBC WITH AUTOMATED DIFF    Collection Time: 12/16/21  5:30 PM   Result Value Ref Range    WBC 6.6 3.6 - 11.0 K/uL    RBC 2.58 (L) 3.80 - 5.20 M/uL    HGB 7.9 (L) 11.5 - 16.0 g/dL    HCT 23.0 (L) 35.0 - 47.0 %    MCV 89.1 80.0 - 99.0 FL    MCH 30.6 26.0 - 34.0 PG    MCHC 34.3 30.0 - 36.5 g/dL    RDW 13.1 11.5 - 14.5 %    PLATELET 880 067 - 435 K/uL    MPV 11.3 8.9 - 12.9 FL    NRBC 0.0 0  WBC    ABSOLUTE NRBC 0.00 0.00 - 0.01 K/uL    NEUTROPHILS 67 32 - 75 %    LYMPHOCYTES 17 12 - 49 %    MONOCYTES 15 (H) 5 - 13 %    EOSINOPHILS 0 0 - 7 %    BASOPHILS 0 0 - 1 %    IMMATURE GRANULOCYTES 1 (H) 0.0 - 0.5 %    ABS. NEUTROPHILS 4.4 1.8 - 8.0 K/UL    ABS. LYMPHOCYTES 1.1 0.8 - 3.5 K/UL    ABS. MONOCYTES 1.0 0.0 - 1.0 K/UL    ABS. EOSINOPHILS 0.0 0.0 - 0.4 K/UL    ABS. BASOPHILS 0.0 0.0 - 0.1 K/UL    ABS. IMM. GRANS. 0.0 0.00 - 0.04 K/UL    DF AUTOMATED     CBC WITH AUTOMATED DIFF    Collection Time: 12/17/21  5:18 AM   Result Value Ref Range    WBC 8.5 3.6 - 11.0 K/uL    RBC 2.40 (L) 3.80 - 5.20 M/uL    HGB 7.4 (L) 11.5 - 16.0 g/dL    HCT 21.7 (L) 35.0 - 47.0 %    MCV 90.4 80.0 - 99.0 FL    MCH 30.8 26.0 - 34.0 PG    MCHC 34.1 30.0 - 36.5 g/dL    RDW 13.1 11.5 - 14.5 %    PLATELET 205 109 - 962 K/uL    MPV 11.6 8.9 - 12.9 FL    NRBC 0.0 0  WBC    ABSOLUTE NRBC 0.00 0.00 - 0.01 K/uL    NEUTROPHILS 72 32 - 75 %    LYMPHOCYTES 13 12 - 49 %    MONOCYTES 15 (H) 5 - 13 %    EOSINOPHILS 0 0 - 7 %    BASOPHILS 0 0 - 1 %    IMMATURE GRANULOCYTES 0 0.0 - 0.5 %    ABS. NEUTROPHILS 6.1 1.8 - 8.0 K/UL    ABS. LYMPHOCYTES 1.1 0.8 - 3.5 K/UL    ABS. MONOCYTES 1.3 (H) 0.0 - 1.0 K/UL    ABS. EOSINOPHILS 0.0 0.0 - 0.4 K/UL    ABS. BASOPHILS 0.0 0.0 - 0.1 K/UL    ABS. IMM.  GRANS. 0.0 0.00 - 0.04 K/UL    DF AUTOMATED     METABOLIC PANEL, COMPREHENSIVE    Collection Time: 12/17/21  5:18 AM   Result Value Ref Range    Sodium 126 (L) 136 - 145 mmol/L    Potassium 4.7 3.5 - 5.1 mmol/L    Chloride 97 97 - 108 mmol/L    CO2 22 21 - 32 mmol/L    Anion gap 7 5 - 15 mmol/L    Glucose 131 (H) 65 - 100 mg/dL    BUN 12 6 - 20 MG/DL    Creatinine 1.06 (H) 0.55 - 1.02 MG/DL    BUN/Creatinine ratio 11 (L) 12 - 20      GFR est AA >60 >60 ml/min/1.73m2    GFR est non-AA 54 (L) >60 ml/min/1.73m2    Calcium 8.5 8.5 - 10.1 MG/DL    Bilirubin, total 1.2 (H) 0.2 - 1.0 MG/DL    ALT (SGPT) 12 12 - 78 U/L    AST (SGOT) 20 15 - 37 U/L    Alk.  phosphatase 60 45 - 117 U/L    Protein, total 6.0 (L) 6.4 - 8.2 g/dL    Albumin 2.6 (L) 3.5 - 5.0 g/dL    Globulin 3.4 2.0 - 4.0 g/dL    A-G Ratio 0.8 (L) 1.1 - 2.2         Assessment: Doing well post-op    Plan: Continue routine post-op care  Oral pain medications  Ambulate  Discussed with patient progress   All Q's answered and verbalized understanding

## 2021-12-18 ENCOUNTER — HOSPITAL ENCOUNTER (INPATIENT)
Dept: CT IMAGING | Age: 51
Discharge: HOME | DRG: 742 | End: 2021-12-18
Attending: OBSTETRICS & GYNECOLOGY | Admitting: SPECIALIST
Payer: COMMERCIAL

## 2021-12-18 ENCOUNTER — APPOINTMENT (OUTPATIENT)
Dept: GENERAL RADIOLOGY | Age: 51
DRG: 742 | End: 2021-12-18
Attending: OBSTETRICS & GYNECOLOGY
Payer: COMMERCIAL

## 2021-12-18 LAB
ALBUMIN SERPL-MCNC: 2.6 G/DL (ref 3.5–5)
ALBUMIN/GLOB SERPL: 0.6 {RATIO} (ref 1.1–2.2)
ALP SERPL-CCNC: 67 U/L (ref 45–117)
ALT SERPL-CCNC: 15 U/L (ref 12–78)
AMORPH CRY URNS QL MICRO: ABNORMAL
ANION GAP SERPL CALC-SCNC: 6 MMOL/L (ref 5–15)
APPEARANCE UR: ABNORMAL
AST SERPL-CCNC: 14 U/L (ref 15–37)
BACTERIA URNS QL MICRO: NEGATIVE /HPF
BILIRUB SERPL-MCNC: 1.9 MG/DL (ref 0.2–1)
BILIRUB UR QL CFM: NEGATIVE
BUN SERPL-MCNC: 13 MG/DL (ref 6–20)
BUN/CREAT SERPL: 11 (ref 12–20)
CALCIUM SERPL-MCNC: 9.7 MG/DL (ref 8.5–10.1)
CHLORIDE SERPL-SCNC: 89 MMOL/L (ref 97–108)
CO2 SERPL-SCNC: 29 MMOL/L (ref 21–32)
COLOR UR: ABNORMAL
CREAT SERPL-MCNC: 1.18 MG/DL (ref 0.55–1.02)
EPITH CASTS URNS QL MICRO: ABNORMAL /LPF
ERYTHROCYTE [DISTWIDTH] IN BLOOD BY AUTOMATED COUNT: 13.3 % (ref 11.5–14.5)
GLOBULIN SER CALC-MCNC: 4.2 G/DL (ref 2–4)
GLUCOSE SERPL-MCNC: 124 MG/DL (ref 65–100)
GLUCOSE UR STRIP.AUTO-MCNC: NEGATIVE MG/DL
HCT VFR BLD AUTO: 21.1 % (ref 35–47)
HGB BLD-MCNC: 7.2 G/DL (ref 11.5–16)
HGB UR QL STRIP: ABNORMAL
KETONES UR QL STRIP.AUTO: 80 MG/DL
LACTATE SERPL-SCNC: 0.8 MMOL/L (ref 0.4–2)
LEUKOCYTE ESTERASE UR QL STRIP.AUTO: ABNORMAL
MCH RBC QN AUTO: 30.8 PG (ref 26–34)
MCHC RBC AUTO-ENTMCNC: 34.1 G/DL (ref 30–36.5)
MCV RBC AUTO: 90.2 FL (ref 80–99)
NITRITE UR QL STRIP.AUTO: NEGATIVE
NRBC # BLD: 0.18 K/UL (ref 0–0.01)
NRBC BLD-RTO: 3.2 PER 100 WBC
OSMOLALITY SERPL: 265 MOSM/KG H2O
PH UR STRIP: 5.5 [PH] (ref 5–8)
PLATELET # BLD AUTO: 256 K/UL (ref 150–400)
PMV BLD AUTO: 11.2 FL (ref 8.9–12.9)
POTASSIUM SERPL-SCNC: 4.3 MMOL/L (ref 3.5–5.1)
PROT SERPL-MCNC: 6.8 G/DL (ref 6.4–8.2)
PROT UR STRIP-MCNC: 100 MG/DL
RBC # BLD AUTO: 2.34 M/UL (ref 3.8–5.2)
RBC #/AREA URNS HPF: ABNORMAL /HPF (ref 0–5)
SODIUM SERPL-SCNC: 124 MMOL/L (ref 136–145)
SP GR UR REFRACTOMETRY: 1.02 (ref 1–1.03)
UA: UC IF INDICATED,UAUC: ABNORMAL
UROBILINOGEN UR QL STRIP.AUTO: 1 EU/DL (ref 0.2–1)
WBC # BLD AUTO: 5.7 K/UL (ref 3.6–11)
WBC URNS QL MICRO: ABNORMAL /HPF (ref 0–4)

## 2021-12-18 PROCEDURE — G0378 HOSPITAL OBSERVATION PER HR: HCPCS

## 2021-12-18 PROCEDURE — 74011000258 HC RX REV CODE- 258: Performed by: SPECIALIST

## 2021-12-18 PROCEDURE — 74011250637 HC RX REV CODE- 250/637: Performed by: SPECIALIST

## 2021-12-18 PROCEDURE — 83605 ASSAY OF LACTIC ACID: CPT

## 2021-12-18 PROCEDURE — 84300 ASSAY OF URINE SODIUM: CPT

## 2021-12-18 PROCEDURE — 80053 COMPREHEN METABOLIC PANEL: CPT

## 2021-12-18 PROCEDURE — 96375 TX/PRO/DX INJ NEW DRUG ADDON: CPT

## 2021-12-18 PROCEDURE — 74018 RADEX ABDOMEN 1 VIEW: CPT

## 2021-12-18 PROCEDURE — 74177 CT ABD & PELVIS W/CONTRAST: CPT

## 2021-12-18 PROCEDURE — 93005 ELECTROCARDIOGRAM TRACING: CPT

## 2021-12-18 PROCEDURE — 65410000002 HC RM PRIVATE OB

## 2021-12-18 PROCEDURE — 74011250636 HC RX REV CODE- 250/636: Performed by: OBSTETRICS & GYNECOLOGY

## 2021-12-18 PROCEDURE — 83935 ASSAY OF URINE OSMOLALITY: CPT

## 2021-12-18 PROCEDURE — 74011000636 HC RX REV CODE- 636: Performed by: RADIOLOGY

## 2021-12-18 PROCEDURE — 71275 CT ANGIOGRAPHY CHEST: CPT

## 2021-12-18 PROCEDURE — 96376 TX/PRO/DX INJ SAME DRUG ADON: CPT

## 2021-12-18 PROCEDURE — 36415 COLL VENOUS BLD VENIPUNCTURE: CPT

## 2021-12-18 PROCEDURE — 74011250636 HC RX REV CODE- 250/636: Performed by: SPECIALIST

## 2021-12-18 PROCEDURE — 74011250637 HC RX REV CODE- 250/637: Performed by: OBSTETRICS & GYNECOLOGY

## 2021-12-18 PROCEDURE — 81001 URINALYSIS AUTO W/SCOPE: CPT

## 2021-12-18 PROCEDURE — 83930 ASSAY OF BLOOD OSMOLALITY: CPT

## 2021-12-18 PROCEDURE — 85027 COMPLETE CBC AUTOMATED: CPT

## 2021-12-18 PROCEDURE — 87040 BLOOD CULTURE FOR BACTERIA: CPT

## 2021-12-18 RX ORDER — LORAZEPAM 2 MG/ML
0.5 INJECTION INTRAMUSCULAR
Status: DISCONTINUED | OUTPATIENT
Start: 2021-12-18 | End: 2021-12-24 | Stop reason: HOSPADM

## 2021-12-18 RX ORDER — ACETAMINOPHEN 500 MG
1000 TABLET ORAL
Status: DISCONTINUED | OUTPATIENT
Start: 2021-12-18 | End: 2021-12-19

## 2021-12-18 RX ORDER — TRAMADOL HYDROCHLORIDE 50 MG/1
50 TABLET ORAL
Status: DISCONTINUED | OUTPATIENT
Start: 2021-12-18 | End: 2021-12-24 | Stop reason: HOSPADM

## 2021-12-18 RX ORDER — PROCHLORPERAZINE EDISYLATE 5 MG/ML
10 INJECTION INTRAMUSCULAR; INTRAVENOUS
Status: DISCONTINUED | OUTPATIENT
Start: 2021-12-18 | End: 2021-12-18 | Stop reason: SDUPTHER

## 2021-12-18 RX ORDER — SODIUM CHLORIDE 9 MG/ML
125 INJECTION, SOLUTION INTRAVENOUS CONTINUOUS
Status: DISCONTINUED | OUTPATIENT
Start: 2021-12-18 | End: 2021-12-19

## 2021-12-18 RX ADMIN — IOPAMIDOL 100 ML: 755 INJECTION, SOLUTION INTRAVENOUS at 23:41

## 2021-12-18 RX ADMIN — OXYCODONE AND ACETAMINOPHEN 1 TABLET: 5; 325 TABLET ORAL at 05:01

## 2021-12-18 RX ADMIN — PANTOPRAZOLE SODIUM 40 MG: 40 TABLET, DELAYED RELEASE ORAL at 09:19

## 2021-12-18 RX ADMIN — DOCUSATE SODIUM 100 MG: 100 CAPSULE ORAL at 09:19

## 2021-12-18 RX ADMIN — ONDANSETRON 4 MG: 2 INJECTION INTRAMUSCULAR; INTRAVENOUS at 06:14

## 2021-12-18 RX ADMIN — TRAMADOL HYDROCHLORIDE 50 MG: 50 TABLET, COATED ORAL at 19:35

## 2021-12-18 RX ADMIN — HYDROMORPHONE HYDROCHLORIDE 1 MG: 1 INJECTION, SOLUTION INTRAMUSCULAR; INTRAVENOUS; SUBCUTANEOUS at 02:18

## 2021-12-18 RX ADMIN — DOCUSATE SODIUM 100 MG: 100 CAPSULE ORAL at 18:57

## 2021-12-18 RX ADMIN — ONDANSETRON 4 MG: 2 INJECTION INTRAMUSCULAR; INTRAVENOUS at 18:26

## 2021-12-18 RX ADMIN — PIPERACILLIN SODIUM AND TAZOBACTAM SODIUM 3.38 G: 3; .375 INJECTION, POWDER, LYOPHILIZED, FOR SOLUTION INTRAVENOUS at 00:42

## 2021-12-18 RX ADMIN — SODIUM CHLORIDE 125 ML/HR: 9 INJECTION, SOLUTION INTRAVENOUS at 20:50

## 2021-12-18 RX ADMIN — PROCHLORPERAZINE EDISYLATE 10 MG: 5 INJECTION, SOLUTION INTRAMUSCULAR; INTRAVENOUS at 22:14

## 2021-12-18 RX ADMIN — TRAMADOL HYDROCHLORIDE 50 MG: 50 TABLET, COATED ORAL at 13:01

## 2021-12-18 RX ADMIN — PIPERACILLIN SODIUM AND TAZOBACTAM SODIUM 3.38 G: 3; .375 INJECTION, POWDER, LYOPHILIZED, FOR SOLUTION INTRAVENOUS at 09:20

## 2021-12-18 RX ADMIN — PIPERACILLIN SODIUM AND TAZOBACTAM SODIUM 3.38 G: 3; .375 INJECTION, POWDER, LYOPHILIZED, FOR SOLUTION INTRAVENOUS at 18:58

## 2021-12-18 RX ADMIN — HYDROMORPHONE HYDROCHLORIDE 1 MG: 1 INJECTION, SOLUTION INTRAMUSCULAR; INTRAVENOUS; SUBCUTANEOUS at 09:17

## 2021-12-18 RX ADMIN — SODIUM CHLORIDE 125 ML/HR: 9 INJECTION, SOLUTION INTRAVENOUS at 13:09

## 2021-12-18 RX ADMIN — ONDANSETRON 4 MG: 2 INJECTION INTRAMUSCULAR; INTRAVENOUS at 13:10

## 2021-12-18 RX ADMIN — DIPHENHYDRAMINE HYDROCHLORIDE 12.5 MG: 50 INJECTION INTRAMUSCULAR; INTRAVENOUS at 00:48

## 2021-12-18 NOTE — PROGRESS NOTES
Pt unable to void since rodgers d/c pt has been up and ambulated and taking in fluid well. Scan pt's bladder for over 200cc of urine. Called and talked with Dr. Greyson Miller about pt's condition and problems voiding. Orders received to reinsert rodgers.

## 2021-12-18 NOTE — PROGRESS NOTES
Talked with OB hospitalist about Pt's episode of Projectile vomiting . Ordered for Pt.  To be NPO until Dr. Taina Avelaro group rounds this AM.

## 2021-12-18 NOTE — PROGRESS NOTES
Post-Operative Rounding Note    Caio Beaver 45 y.o. POD # 3 s/p DAVINCI ASSISTED LAPROSCOPIC SUPRACERVICAL HYSTERECTOMY,WITH  BILATERAL SALPINGO-OOPHORECTOMY, EXTENSIVE LYSIS OF ADHESIONS, UTERUS GREATER THAN 250 GRAMS    Subjective:  Patient doing well over all. Just received dilaudid. Discussed trying to continue pain with motrin and tylenol if possible. Patient is ambulating minimally, passing flatus and has had a bowel movement. She recently had emesis x1. No fever, chills, CP, SOB, calf pain. Objective:  VS:   Visit Vitals  /63 (BP 1 Location: Left lower arm, BP Patient Position: At rest)   Pulse (!) 115   Temp 98.7 °F (37.1 °C) Comment: MD aware of Mews score    Resp 17   Ht 5' (1.524 m)   Wt 127 kg (280 lb)   SpO2 94%   BMI 54.68 kg/m²       Gen: NAD, resting in bed comfortably   Abd: soft, appropriately TTP, no peritoneal signs,+ BS, distended, ecchymosis stable as outlined   Inc: Clean/Dry/Intact  Ext: warm, dry, nontender, with trace edema noted,  Labs: No results found for this or any previous visit (from the past 24 hour(s)). Intake/Output Summary (Last 24 hours) at 12/18/2021 1019  Last data filed at 12/18/2021 0555  Gross per 24 hour   Intake --   Output 1000 ml   Net -1000 ml       Assessment:  Caio Beaver 45 y.o. POD # 3 s/p DAVINCI ASSISTED LAPROSCOPIC SUPRACERVICAL HYSTERECTOMY,WITH  BILATERAL SALPINGO-OOPHORECTOMY, EXTENSIVE LYSIS OF ADHESIONS, UTERUS GREATER THAN 250 GRAMS  Patient passing flatus and has had bowel movement   Urinary retention      Hahn in place   Creatine . 5 --> 1.06      IVF running at 25 cc, urine dark yellow   Hemoglobin stable       Plan:  Increase IVF to 125 cc  Ambulate   Pain control, preferably with non narcotics   Hahn out later today, voiding trial   Advance diet as tolerated     Hilary Hastings MD

## 2021-12-18 NOTE — PROGRESS NOTES
26 Pt was assisted by Primary RN to turn on left side for additional comfort. Pt had large episode of vomiting coffee ground emesis onto the floor and into the trash can. Paged on call MD - no answer. Reached out to [facility] Hospitalist to request returned call. 65 Spoke with Dr. Celso Kent re: N/V, abd x-ray ordered.

## 2021-12-19 ENCOUNTER — APPOINTMENT (OUTPATIENT)
Dept: GENERAL RADIOLOGY | Age: 51
DRG: 742 | End: 2021-12-19
Attending: OBSTETRICS & GYNECOLOGY
Payer: COMMERCIAL

## 2021-12-19 LAB
ALBUMIN SERPL-MCNC: 2.3 G/DL (ref 3.5–5)
ANION GAP SERPL CALC-SCNC: 5 MMOL/L (ref 5–15)
ANION GAP SERPL CALC-SCNC: 6 MMOL/L (ref 5–15)
ANION GAP SERPL CALC-SCNC: 6 MMOL/L (ref 5–15)
ANION GAP SERPL CALC-SCNC: 7 MMOL/L (ref 5–15)
ANION GAP SERPL CALC-SCNC: 7 MMOL/L (ref 5–15)
ATRIAL RATE: 101 BPM
ATRIAL RATE: 360 BPM
BASOPHILS # BLD: 0 K/UL (ref 0–0.1)
BASOPHILS NFR BLD: 0 % (ref 0–1)
BUN SERPL-MCNC: 14 MG/DL (ref 6–20)
BUN SERPL-MCNC: 14 MG/DL (ref 6–20)
BUN SERPL-MCNC: 15 MG/DL (ref 6–20)
BUN SERPL-MCNC: 15 MG/DL (ref 6–20)
BUN SERPL-MCNC: 16 MG/DL (ref 6–20)
BUN/CREAT SERPL: 12 (ref 12–20)
BUN/CREAT SERPL: 13 (ref 12–20)
CALCIUM SERPL-MCNC: 8.7 MG/DL (ref 8.5–10.1)
CALCIUM SERPL-MCNC: 8.9 MG/DL (ref 8.5–10.1)
CALCIUM SERPL-MCNC: 9 MG/DL (ref 8.5–10.1)
CALCULATED P AXIS, ECG09: 35 DEGREES
CALCULATED P AXIS, ECG09: 40 DEGREES
CALCULATED R AXIS, ECG10: 34 DEGREES
CALCULATED R AXIS, ECG10: 46 DEGREES
CALCULATED T AXIS, ECG11: 29 DEGREES
CALCULATED T AXIS, ECG11: 54 DEGREES
CHLORIDE SERPL-SCNC: 90 MMOL/L (ref 97–108)
CHLORIDE SERPL-SCNC: 91 MMOL/L (ref 97–108)
CHLORIDE SERPL-SCNC: 91 MMOL/L (ref 97–108)
CHLORIDE SERPL-SCNC: 93 MMOL/L (ref 97–108)
CHLORIDE SERPL-SCNC: 93 MMOL/L (ref 97–108)
CO2 SERPL-SCNC: 29 MMOL/L (ref 21–32)
CO2 SERPL-SCNC: 30 MMOL/L (ref 21–32)
CO2 SERPL-SCNC: 31 MMOL/L (ref 21–32)
COMMENT, HOLDF: NORMAL
CORTIS AM PEAK SERPL-MCNC: 41.5 UG/DL (ref 4.3–22.45)
CREAT SERPL-MCNC: 1.1 MG/DL (ref 0.55–1.02)
CREAT SERPL-MCNC: 1.12 MG/DL (ref 0.55–1.02)
CREAT SERPL-MCNC: 1.14 MG/DL (ref 0.55–1.02)
CREAT SERPL-MCNC: 1.16 MG/DL (ref 0.55–1.02)
CREAT SERPL-MCNC: 1.27 MG/DL (ref 0.55–1.02)
DIAGNOSIS, 93000: NORMAL
DIAGNOSIS, 93000: NORMAL
DIFFERENTIAL METHOD BLD: ABNORMAL
EOSINOPHIL # BLD: 0 K/UL (ref 0–0.4)
EOSINOPHIL NFR BLD: 1 % (ref 0–7)
ERYTHROCYTE [DISTWIDTH] IN BLOOD BY AUTOMATED COUNT: 13.3 % (ref 11.5–14.5)
FLUAV RNA SPEC QL NAA+PROBE: NOT DETECTED
FLUBV RNA SPEC QL NAA+PROBE: NOT DETECTED
GLUCOSE SERPL-MCNC: 101 MG/DL (ref 65–100)
GLUCOSE SERPL-MCNC: 113 MG/DL (ref 65–100)
GLUCOSE SERPL-MCNC: 115 MG/DL (ref 65–100)
GLUCOSE SERPL-MCNC: 130 MG/DL (ref 65–100)
GLUCOSE SERPL-MCNC: 132 MG/DL (ref 65–100)
HCT VFR BLD AUTO: 18.3 % (ref 35–47)
HGB BLD-MCNC: 6.1 G/DL (ref 11.5–16)
HISTORY CHECKED?,CKHIST: NORMAL
IMM GRANULOCYTES # BLD AUTO: 0 K/UL (ref 0–0.04)
IMM GRANULOCYTES NFR BLD AUTO: 1 % (ref 0–0.5)
LYMPHOCYTES # BLD: 0.8 K/UL (ref 0.8–3.5)
LYMPHOCYTES NFR BLD: 16 % (ref 12–49)
MCH RBC QN AUTO: 30.2 PG (ref 26–34)
MCHC RBC AUTO-ENTMCNC: 33.3 G/DL (ref 30–36.5)
MCV RBC AUTO: 90.6 FL (ref 80–99)
MONOCYTES # BLD: 1 K/UL (ref 0–1)
MONOCYTES NFR BLD: 21 % (ref 5–13)
NEUTS SEG # BLD: 3 K/UL (ref 1.8–8)
NEUTS SEG NFR BLD: 61 % (ref 32–75)
NRBC # BLD: 0.14 K/UL (ref 0–0.01)
NRBC BLD-RTO: 2.9 PER 100 WBC
OSMOLALITY UR: 697 MOSM/KG H2O
OSMOLALITY UR: 747 MOSM/KG H2O
P-R INTERVAL, ECG05: 168 MS
PHOSPHATE SERPL-MCNC: 2.8 MG/DL (ref 2.6–4.7)
PLATELET # BLD AUTO: 217 K/UL (ref 150–400)
PMV BLD AUTO: 10.8 FL (ref 8.9–12.9)
POTASSIUM SERPL-SCNC: 3.2 MMOL/L (ref 3.5–5.1)
POTASSIUM SERPL-SCNC: 3.2 MMOL/L (ref 3.5–5.1)
POTASSIUM SERPL-SCNC: 3.5 MMOL/L (ref 3.5–5.1)
POTASSIUM SERPL-SCNC: 3.7 MMOL/L (ref 3.5–5.1)
POTASSIUM SERPL-SCNC: 3.8 MMOL/L (ref 3.5–5.1)
POTASSIUM UR-SCNC: 22 MMOL/L
PROCALCITONIN SERPL-MCNC: 0.71 NG/ML
Q-T INTERVAL, ECG07: 314 MS
Q-T INTERVAL, ECG07: 334 MS
QRS DURATION, ECG06: 84 MS
QRS DURATION, ECG06: 90 MS
QTC CALCULATION (BEZET), ECG08: 433 MS
QTC CALCULATION (BEZET), ECG08: 443 MS
RBC # BLD AUTO: 2.02 M/UL (ref 3.8–5.2)
RBC MORPH BLD: ABNORMAL
SAMPLES BEING HELD,HOLD: NORMAL
SARS-COV-2, COV2: NOT DETECTED
SODIUM SERPL-SCNC: 127 MMOL/L (ref 136–145)
SODIUM SERPL-SCNC: 127 MMOL/L (ref 136–145)
SODIUM SERPL-SCNC: 128 MMOL/L (ref 136–145)
SODIUM SERPL-SCNC: 129 MMOL/L (ref 136–145)
SODIUM SERPL-SCNC: 130 MMOL/L (ref 136–145)
SODIUM UR-SCNC: 14 MMOL/L
SODIUM UR-SCNC: <5 MMOL/L
TSH SERPL DL<=0.05 MIU/L-ACNC: 1.31 UIU/ML (ref 0.36–3.74)
VENTRICULAR RATE, ECG03: 101 BPM
VENTRICULAR RATE, ECG03: 120 BPM
WBC # BLD AUTO: 4.8 K/UL (ref 3.6–11)

## 2021-12-19 PROCEDURE — 84133 ASSAY OF URINE POTASSIUM: CPT

## 2021-12-19 PROCEDURE — 80048 BASIC METABOLIC PNL TOTAL CA: CPT

## 2021-12-19 PROCEDURE — 30233N1 TRANSFUSION OF NONAUTOLOGOUS RED BLOOD CELLS INTO PERIPHERAL VEIN, PERCUTANEOUS APPROACH: ICD-10-PCS | Performed by: OBSTETRICS & GYNECOLOGY

## 2021-12-19 PROCEDURE — 93005 ELECTROCARDIOGRAM TRACING: CPT

## 2021-12-19 PROCEDURE — 82533 TOTAL CORTISOL: CPT

## 2021-12-19 PROCEDURE — 74011000258 HC RX REV CODE- 258: Performed by: FAMILY MEDICINE

## 2021-12-19 PROCEDURE — 65410000002 HC RM PRIVATE OB

## 2021-12-19 PROCEDURE — 36430 TRANSFUSION BLD/BLD COMPNT: CPT

## 2021-12-19 PROCEDURE — 74018 RADEX ABDOMEN 1 VIEW: CPT

## 2021-12-19 PROCEDURE — 74011250637 HC RX REV CODE- 250/637: Performed by: SPECIALIST

## 2021-12-19 PROCEDURE — 84145 PROCALCITONIN (PCT): CPT

## 2021-12-19 PROCEDURE — 84443 ASSAY THYROID STIM HORMONE: CPT

## 2021-12-19 PROCEDURE — 84300 ASSAY OF URINE SODIUM: CPT

## 2021-12-19 PROCEDURE — 83935 ASSAY OF URINE OSMOLALITY: CPT

## 2021-12-19 PROCEDURE — 51798 US URINE CAPACITY MEASURE: CPT

## 2021-12-19 PROCEDURE — 74011250636 HC RX REV CODE- 250/636: Performed by: OBSTETRICS & GYNECOLOGY

## 2021-12-19 PROCEDURE — 85025 COMPLETE CBC W/AUTO DIFF WBC: CPT

## 2021-12-19 PROCEDURE — 87636 SARSCOV2 & INF A&B AMP PRB: CPT

## 2021-12-19 PROCEDURE — 80069 RENAL FUNCTION PANEL: CPT

## 2021-12-19 PROCEDURE — P9016 RBC LEUKOCYTES REDUCED: HCPCS

## 2021-12-19 PROCEDURE — 74011250636 HC RX REV CODE- 250/636: Performed by: FAMILY MEDICINE

## 2021-12-19 PROCEDURE — 36415 COLL VENOUS BLD VENIPUNCTURE: CPT

## 2021-12-19 RX ORDER — ACETAMINOPHEN 650 MG/1
1000 SUPPOSITORY RECTAL
Status: DISCONTINUED | OUTPATIENT
Start: 2021-12-19 | End: 2021-12-24 | Stop reason: HOSPADM

## 2021-12-19 RX ORDER — DEXTROSE, SODIUM CHLORIDE, AND POTASSIUM CHLORIDE 5; .45; .15 G/100ML; G/100ML; G/100ML
75 INJECTION INTRAVENOUS CONTINUOUS
Status: DISCONTINUED | OUTPATIENT
Start: 2021-12-19 | End: 2021-12-19

## 2021-12-19 RX ORDER — DEXTROSE MONOHYDRATE AND SODIUM CHLORIDE 5; .9 G/100ML; G/100ML
125 INJECTION, SOLUTION INTRAVENOUS CONTINUOUS
Status: DISCONTINUED | OUTPATIENT
Start: 2021-12-19 | End: 2021-12-20

## 2021-12-19 RX ORDER — KETOROLAC TROMETHAMINE 30 MG/ML
30 INJECTION, SOLUTION INTRAMUSCULAR; INTRAVENOUS ONCE
Status: COMPLETED | OUTPATIENT
Start: 2021-12-19 | End: 2021-12-19

## 2021-12-19 RX ORDER — SODIUM CHLORIDE 9 MG/ML
125 INJECTION, SOLUTION INTRAVENOUS AS NEEDED
Status: DISCONTINUED | OUTPATIENT
Start: 2021-12-19 | End: 2021-12-24 | Stop reason: HOSPADM

## 2021-12-19 RX ADMIN — Medication 10 ML: at 14:00

## 2021-12-19 RX ADMIN — PIPERACILLIN SODIUM AND TAZOBACTAM SODIUM 3.38 G: 3; .375 INJECTION, POWDER, LYOPHILIZED, FOR SOLUTION INTRAVENOUS at 18:14

## 2021-12-19 RX ADMIN — KETOROLAC TROMETHAMINE 30 MG: 30 INJECTION, SOLUTION INTRAMUSCULAR; INTRAVENOUS at 01:04

## 2021-12-19 RX ADMIN — PIPERACILLIN SODIUM AND TAZOBACTAM SODIUM 3.38 G: 3; .375 INJECTION, POWDER, LYOPHILIZED, FOR SOLUTION INTRAVENOUS at 03:25

## 2021-12-19 RX ADMIN — DEXTROSE AND SODIUM CHLORIDE 75 ML/HR: 5; 900 INJECTION, SOLUTION INTRAVENOUS at 09:00

## 2021-12-19 RX ADMIN — PIPERACILLIN SODIUM AND TAZOBACTAM SODIUM 3.38 G: 3; .375 INJECTION, POWDER, LYOPHILIZED, FOR SOLUTION INTRAVENOUS at 11:32

## 2021-12-19 NOTE — PROGRESS NOTES
Bedside and Verbal shift change report given to MATT Curry RN (oncoming nurse) by Rubi Carreon RN (offgoing nurse). Report included the following information SBAR and Kardex.        0530 Pt pulled NG out while sleeping due to a vivid dream    0630 New NG placed, orders in to check placement

## 2021-12-19 NOTE — PROGRESS NOTES
Problem: Falls - Risk of  Goal: *Absence of Falls  Description: Document Pershing Flow Fall Risk and appropriate interventions in the flowsheet. Outcome: Progressing Towards Goal  Note: Fall Risk Interventions:  Mobility Interventions: Patient to call before getting OOB         Medication Interventions: Teach patient to arise slowly,Patient to call before getting OOB    Elimination Interventions: Call light in reach,Patient to call for help with toileting needs    History of Falls Interventions: Bed/chair exit alarm         Problem: Patient Education: Go to Patient Education Activity  Goal: Patient/Family Education  Outcome: Progressing Towards Goal     Problem: Pressure Injury - Risk of  Goal: *Prevention of pressure injury  Description: Document Scar Scale and appropriate interventions in the flowsheet. Outcome: Progressing Towards Goal  Note: Pressure Injury Interventions:             Activity Interventions: Increase time out of bed    Mobility Interventions: Pressure redistribution bed/mattress (bed type),HOB 30 degrees or less    Nutrition Interventions: Document food/fluid/supplement intake                     Problem: Patient Education: Go to Patient Education Activity  Goal: Patient/Family Education  Outcome: Progressing Towards Goal     Problem: Pain  Goal: *Control of Pain  Outcome: Progressing Towards Goal     Problem: Patient Education: Go to Patient Education Activity  Goal: Patient/Family Education  Outcome: Progressing Towards Goal

## 2021-12-19 NOTE — PROGRESS NOTES
Post-Operative Rounding Note    Farhad Beaver 45 y.o. POD # 4 s/p DAVINCI ASSISTED LAPROSCOPIC SUPRACERVICAL HYSTERECTOMY,WITH  BILATERAL SALPINGO-OOPHORECTOMY, EXTENSIVE LYSIS OF ADHESIONS, UTERUS GREATER THAN 250 GRAMS    Subjective:  NG tube accidentally pulled by patient overnight. Replaced this AM. Patient continues to feel nauseated. Abdominal pain is improving. She is reporting acid reflux. No fever, chills, CP, SOB, calf pain. Objective:  VS:   Visit Vitals  /75 (BP 1 Location: Left lower arm, BP Patient Position: At rest)   Pulse (!) 115   Temp 98.3 °F (36.8 °C)   Resp 20   Ht 5' (1.524 m)   Wt 127 kg (280 lb)   SpO2 95% Comment: on 2L, 88 on RA   BMI 54.68 kg/m²       Gen: NAD, resting in bed comfortably   Abd: soft, appropriately TTP, no peritoneal signs,hypoactive bowel sounds, distended, ecchymosis with 3 cm spread beyond medical border of previous outline. Mons pubis with increased bruising. The area is soft. Inc: Clean/Dry/Intact  Ext: warm, dry, nontender, with trace edema noted  Labs: Intake/Output Summary (Last 24 hours) at 12/19/2021 0711  Last data filed at 12/19/2021 0128  Gross per 24 hour   Intake 1000 ml   Output 1175 ml   Net -175 ml       Assessment:  Farhad Beaver 45 y.o. Results for Silvia Euceda (MRN [id number]) as of 12/19/2021 07:15   Ref.  Range 12/19/2021 06:02   WBC Latest Ref Range: 3.6 - 11.0 K/uL 4.8   NRBC Latest Ref Range: 0  WBC 2.9 (H)   RBC Latest Ref Range: 3.80 - 5.20 M/uL 2.02 (L)   HGB Latest Ref Range: 11.5 - 16.0 g/dL 6.1 (L)   HCT Latest Ref Range: 35.0 - 47.0 % 18.3 (L)   MCV Latest Ref Range: 80.0 - 99.0 FL 90.6   MCH Latest Ref Range: 26.0 - 34.0 PG 30.2   MCHC Latest Ref Range: 30.0 - 36.5 g/dL 33.3   RDW Latest Ref Range: 11.5 - 14.5 % 13.3   PLATELET Latest Ref Range: 150 - 400 K/uL 217   MPV Latest Ref Range: 8.9 - 12.9 FL 10.8   NEUTROPHILS Latest Ref Range: 32 - 75 % 61   LYMPHOCYTES Latest Ref Range: 12 - 49 % 16 MONOCYTES Latest Ref Range: 5 - 13 % 21 (H)   EOSINOPHILS Latest Ref Range: 0 - 7 % 1   BASOPHILS Latest Ref Range: 0 - 1 % 0   IMMATURE GRANULOCYTES Latest Ref Range: 0.0 - 0.5 % 1 (H)   DF Latest Units:   SMEAR SCANNED   ABSOLUTE NRBC Latest Ref Range: 0.00 - 0.01 K/uL 0.14 (H)   ABS. NEUTROPHILS Latest Ref Range: 1.8 - 8.0 K/UL 3.0   ABS. IMM. GRANS. Latest Ref Range: 0.00 - 0.04 K/UL 0.0   ABS. LYMPHOCYTES Latest Ref Range: 0.8 - 3.5 K/UL 0.8   ABS. MONOCYTES Latest Ref Range: 0.0 - 1.0 K/UL 1.0   ABS. EOSINOPHILS Latest Ref Range: 0.0 - 0.4 K/UL 0.0   ABS. BASOPHILS Latest Ref Range: 0.0 - 0.1 K/UL 0.0     Results for Meredith Villarreal (MRN [id number]) as of 12/19/2021 07:15   Ref.  Range 12/19/2021 06:02   Sodium Latest Ref Range: 136 - 145 mmol/L 128 (L)   Potassium Latest Ref Range: 3.5 - 5.1 mmol/L 3.5   Chloride Latest Ref Range: 97 - 108 mmol/L 91 (L)   CO2 Latest Ref Range: 21 - 32 mmol/L 31   Anion gap Latest Ref Range: 5 - 15 mmol/L 6   Glucose Latest Ref Range: 65 - 100 mg/dL 101 (H)   BUN Latest Ref Range: 6 - 20 MG/DL 15   Creatinine Latest Ref Range: 0.55 - 1.02 MG/DL 1.12 (H)   BUN/Creatinine ratio Latest Ref Range: 12 - 20   13   Calcium Latest Ref Range: 8.5 - 10.1 MG/DL 8.7   GFR est non-AA Latest Ref Range: >60 ml/min/1.73m2 51 (L)   GFR est AA Latest Ref Range: >60 ml/min/1.73m2 >60       POD # 4 s/p DAVINCI ASSISTED LAPROSCOPIC SUPRACERVICAL HYSTERECTOMY,WITH  BILATERAL SALPINGO-OOPHORECTOMY, EXTENSIVE LYSIS OF ADHESIONS, UTERUS GREATER THAN 250 GRAMS    Postoperative Ileus   NG tube in placed, 1100 cc's of dark green output since placement   Hyponatremia   Medicine following, appreciate recs  Acute blood loss anemia   Postop Fever    On Zosyn    Imaging without sign of infection as cause    COVID and Flu negative   Hypoxia    Suspect sleep apnea   Urinary retention       Plan:  CCM  1 unit pRBCs, repeat CBC 6 hours post transfusion   Attempt voiding trial this afternoon  Appreciate Beau Escobar MD

## 2021-12-19 NOTE — PROGRESS NOTES
2100: spoke to on call  and physician paged    2120: spoke to on call  and informed him no physical has called back    2124: Spoke to Peyton Murphy MD who stated she didn't have privledges at [facility] and to please call Dr Aldo Barker     2126: Oly Haitian Dr Aldo Barker and informed her of pt's 130 heart rate, 100.8 temp, pt's complaint of chest pain (pt states it just feels like acid reflux) and urine output. Received orders for 1,000mg tylenol q6hrs, ativan 0.5 q8hrs, CBC with dif in the morning (RN can order a art stick if unable to obtain lab), and to place an NG tube. All orders repeated back to MD for accuracy. 2157: attempted to give pt oral tylenol. Pt started projectile vomiting. Rapid Response called to help assist RN with multiple orders including, new iv, lab work, EKG, and NG.     2105: Rapid Response team at bedside.

## 2021-12-19 NOTE — PROGRESS NOTES
Bedside and Verbal shift change report given to CELI Mccloud (oncoming nurse) by Suzy Dowling (offgoing nurse). Report included the following information SBAR, Kardex, Procedure Summary, Intake/Output and MAR.

## 2021-12-19 NOTE — CONSULTS
[facility]   [address] [facility], [address] [place] [address]  Phone: [phone]   [phone] NOTE     Patient: Rowena Perea MRN: [id number]  PCP: Mark De Paz NP   :     [id number]  Age:   45 y.o. Sex:  female      Referring physician: Aneesh Granger MD  Reason for consultation: 45 y.o. female with Fibroid uterus [P74.5] complicated by Hyponatremia   Admission Date: 12/15/2021  6:55 AM  LOS: 1 day      ASSESSMENT and PLAN :   Acute hyponatremia:  -Suspect hypovolemic hyponatremia  -Urine osmolality inappropriately high--> likely 2/2 SIADH from pain, vomiting and intravascular volume depletion, nausea  -Repeat urine chemistries today  -Agree with IVF -D5 NS which has an osmolality of 560. Increase rate to 125 cc an hour  -Check TSH and a.m. cortisol  -Repeat labs this p.m. and again tomorrow  -She has received a few doses of Toradol. Recommend avoiding further use of NSAIDs as they can worsen hyponatremia    S/p laparoscopic JOSE plus BSO, extensive lysis of adhesions  -Postop care per GYN    Postop SB ileus  -NGT for suction  -IVF as above    Postop blood loss anemia  -Check iron profile    Fever  -Work-up per primary team      Care Plan discussed with: Patient and RN        Thank you for consulting [facility] in the care of your patient. Subjective:   HPI: Rowena Perea is a 45 y.o.  female who has been admitted to the hospital for elective [address] laparoscopic hysterectomy, BSO and extensive lysis of adhesions which she underwent on 12/15/2021. Her postop course has been complicated by blood loss anemia, small bowel ileus requiring NG tube placement since yesterday and hyponatremia. She had nausea and vomiting for 2 days prior to NG tube insertion. She has poor p.o. intake in her postop course. She has been on hypotonic fluids which were changed to isotonic fluids overnight.   She has received few doses of Toradol for pain control. She reports history of chronic naproxen use for ankle tendinitis. Urine chemistries yesterday showed urine sodium less than 5 and urine osmolality greater than 700. IV fluids were changed to D5 normal saline overnight and her serum sodium improved from 1 24 to 128. She denies any prior history of thyroid disease, chronic alcohol use, adrenal disease. Past Medical Hx:   Past Medical History:   Diagnosis Date    Arthritis     Knee injury     RIGHT    Tendonitis     LEFT ANKLE        Past Surgical Hx:     Past Surgical History:   Procedure Laterality Date    HX ADENOIDECTOMY  1975    HX HEENT  1975    BMT    HX OTHER SURGICAL  2005    RIGHT ANKLE         No Known Allergies    Social Hx:  reports that she has never smoked. She has never used smokeless tobacco. She reports current alcohol use of about 4.0 standard drinks of alcohol per week. She reports that she does not use drugs. Family History   Problem Relation Age of Onset    No Known Problems Mother     Cancer Father 62        agent orange    Alzheimer's Disease Paternal Grandmother     Other Sister         FIBROMYALGIA, TRIGEMINAL NEUROLOGIA    Anesth Problems Neg Hx        Review of Systems:  A thorough twelve point review of system was performed today. Pertinent positives and negatives are mentioned in the HPI. The reminder of the ROS is negative and noncontributory.      Objective:    Vitals:    Vitals:    12/19/21 0935 12/19/21 0950 12/19/21 1020 12/19/21 1050   BP: 133/68 136/84 129/81 (!) 143/80   Pulse: (!) 101 (!) 102 (!) 106 (!) 101   Resp: 19 20 19 20   Temp: 97.7 °F (36.5 °C) 98.4 °F (36.9 °C) 98.2 °F (36.8 °C) 98 °F (36.7 °C)   SpO2: 97% 97% 97% 97%   Weight:       Height:         I&O's:  12/18 0701 - 12/19 0700  In: 1000 [I.V.:1000]  Out: 1625 [Urine:825]  Visit Vitals  BP (!) 143/80 (BP 1 Location: Left lower arm, BP Patient Position: At rest;Supine)   Pulse (!) 101   Temp 98 °F (36.7 °C)   Resp 20 Ht 5' (1.524 m)   Wt 127 kg (280 lb)   SpO2 97%   BMI 54.68 kg/m²       Physical Exam:  General: obese  HEENT: NGT  Neck: Supple,no mass palpable  Lungs : CTA  CVS: RRR, S1 S2 normal, No murmur   Abdomen: Soft, NT, BS +  Extremities: no Edema  Skin: No rash or lesions. MS: No joint swelling, erythema, warmth  Neurologic: non focal, AAO x 3  Psych: normal affect/     Laboratory Results:    Recent Labs     12/19/21  1031 12/19/21  0602 12/19/21  0220 12/18/21 2203 12/18/21 2203 12/17/21  0518 12/17/21  0518 12/16/21  1244   * 128* 127*   < > 124*   < > 126*  --    K 3.7 3.5 3.8   < > 4.3   < > 4.7  --    CL 91* 91* 90*   < > 89*   < > 97  --    CO2 31 31 30   < > 29   < > 22  --    * 101* 115*   < > 124*   < > 131*  --    BUN 16 15 14   < > 13   < > 12  --    CREA 1.27* 1.12* 1.16*   < > 1.18*   < > 1.06*  --    CA 9.0 8.7 9.0   < > 9.7   < > 8.5  --    ALB  --   --   --   --  2.6*  --  2.6*  --    ALT  --   --   --   --  15  --  12  --    INR  --   --   --   --   --   --   --  1.0    < > = values in this interval not displayed.      Recent Labs     12/19/21  0602 12/18/21 2203 12/17/21 0518   WBC 4.8 5.7 8.5   HGB 6.1* 7.2* 7.4*   HCT 18.3* 21.1* 21.7*    256 186     No results found for: SDES  Lab Results   Component Value Date/Time    Culture result: NO GROWTH AFTER 6 HOURS 12/18/2021 10:03 PM     Recent Results (from the past 24 hour(s))   CBC W/O DIFF    Collection Time: 12/18/21 10:03 PM   Result Value Ref Range    WBC 5.7 3.6 - 11.0 K/uL    RBC 2.34 (L) 3.80 - 5.20 M/uL    HGB 7.2 (L) 11.5 - 16.0 g/dL    HCT 21.1 (L) 35.0 - 47.0 %    MCV 90.2 80.0 - 99.0 FL    MCH 30.8 26.0 - 34.0 PG    MCHC 34.1 30.0 - 36.5 g/dL    RDW 13.3 11.5 - 14.5 %    PLATELET 694 350 - 110 K/uL    MPV 11.2 8.9 - 12.9 FL    NRBC 3.2 (H) 0  WBC    ABSOLUTE NRBC 0.18 (H) 0.00 - 0.01 K/uL   LACTIC ACID    Collection Time: 12/18/21 10:03 PM   Result Value Ref Range    Lactic acid 0.8 0.4 - 2.0 MMOL/L METABOLIC PANEL, COMPREHENSIVE    Collection Time: 12/18/21 10:03 PM   Result Value Ref Range    Sodium 124 (L) 136 - 145 mmol/L    Potassium 4.3 3.5 - 5.1 mmol/L    Chloride 89 (L) 97 - 108 mmol/L    CO2 29 21 - 32 mmol/L    Anion gap 6 5 - 15 mmol/L    Glucose 124 (H) 65 - 100 mg/dL    BUN 13 6 - 20 MG/DL    Creatinine 1.18 (H) 0.55 - 1.02 MG/DL    BUN/Creatinine ratio 11 (L) 12 - 20      GFR est AA 58 (L) >60 ml/min/1.73m2    GFR est non-AA 48 (L) >60 ml/min/1.73m2    Calcium 9.7 8.5 - 10.1 MG/DL    Bilirubin, total 1.9 (H) 0.2 - 1.0 MG/DL    ALT (SGPT) 15 12 - 78 U/L    AST (SGOT) 14 (L) 15 - 37 U/L    Alk.  phosphatase 67 45 - 117 U/L    Protein, total 6.8 6.4 - 8.2 g/dL    Albumin 2.6 (L) 3.5 - 5.0 g/dL    Globulin 4.2 (H) 2.0 - 4.0 g/dL    A-G Ratio 0.6 (L) 1.1 - 2.2     CULTURE, BLOOD    Collection Time: 12/18/21 10:03 PM    Specimen: Blood   Result Value Ref Range    Special Requests: NO SPECIAL REQUESTS      Culture result: NO GROWTH AFTER 6 HOURS     OSMOLALITY, SERUM/PLASMA    Collection Time: 12/18/21 10:03 PM   Result Value Ref Range    Osmolality, serum/plasma 265 mOsm/kg H2O   URINALYSIS W/ REFLEX CULTURE    Collection Time: 12/18/21 10:39 PM    Specimen: Miscellaneous sample; Urine    Urine specimen   Result Value Ref Range    Color DARK YELLOW      Appearance TURBID (A) CLEAR      Specific gravity 1.025 1.003 - 1.030      pH (UA) 5.5 5.0 - 8.0      Protein 100 (A) NEG mg/dL    Glucose Negative NEG mg/dL    Ketone 80 (A) NEG mg/dL    Blood SMALL (A) NEG      Urobilinogen 1.0 0.2 - 1.0 EU/dL    Nitrites Negative NEG      Leukocyte Esterase TRACE (A) NEG      WBC 0-4 0 - 4 /hpf    RBC 0-5 0 - 5 /hpf    Epithelial cells FEW FEW /lpf    Bacteria Negative NEG /hpf    UA:UC IF INDICATED CULTURE NOT INDICATED BY UA RESULT CNI      Amorphous Crystals 3+ (A) NEG   SODIUM, UR, RANDOM    Collection Time: 12/18/21 10:39 PM   Result Value Ref Range    Sodium,urine random <5 MMOL/L   OSMOLALITY, UR    Collection Time: 12/18/21 10:39 PM   Result Value Ref Range    Osmolality,urine 747 MOSM/kg H2O   BILIRUBIN, CONFIRM    Collection Time: 12/18/21 10:39 PM   Result Value Ref Range    Bilirubin UA, confirm Negative NEG     SAMPLES BEING HELD    Collection Time: 12/19/21  2:20 AM   Result Value Ref Range    SAMPLES BEING HELD 1pst     COMMENT        Add-on orders for these samples will be processed based on acceptable specimen integrity and analyte stability, which may vary by analyte. METABOLIC PANEL, BASIC    Collection Time: 12/19/21  2:20 AM   Result Value Ref Range    Sodium 127 (L) 136 - 145 mmol/L    Potassium 3.8 3.5 - 5.1 mmol/L    Chloride 90 (L) 97 - 108 mmol/L    CO2 30 21 - 32 mmol/L    Anion gap 7 5 - 15 mmol/L    Glucose 115 (H) 65 - 100 mg/dL    BUN 14 6 - 20 MG/DL    Creatinine 1.16 (H) 0.55 - 1.02 MG/DL    BUN/Creatinine ratio 12 12 - 20      GFR est AA 59 (L) >60 ml/min/1.73m2    GFR est non-AA 49 (L) >60 ml/min/1.73m2    Calcium 9.0 8.5 - 10.1 MG/DL   COVID-19 WITH INFLUENZA A/B    Collection Time: 12/19/21  6:01 AM   Result Value Ref Range    SARS-CoV-2 Not detected NOTD      Influenza A by PCR Not detected NOTD      Influenza B by PCR Not detected NOTD     CBC WITH AUTOMATED DIFF    Collection Time: 12/19/21  6:02 AM   Result Value Ref Range    WBC 4.8 3.6 - 11.0 K/uL    RBC 2.02 (L) 3.80 - 5.20 M/uL    HGB 6.1 (L) 11.5 - 16.0 g/dL    HCT 18.3 (L) 35.0 - 47.0 %    MCV 90.6 80.0 - 99.0 FL    MCH 30.2 26.0 - 34.0 PG    MCHC 33.3 30.0 - 36.5 g/dL    RDW 13.3 11.5 - 14.5 %    PLATELET 906 598 - 764 K/uL    MPV 10.8 8.9 - 12.9 FL    NRBC 2.9 (H) 0  WBC    ABSOLUTE NRBC 0.14 (H) 0.00 - 0.01 K/uL    NEUTROPHILS 61 32 - 75 %    LYMPHOCYTES 16 12 - 49 %    MONOCYTES 21 (H) 5 - 13 %    EOSINOPHILS 1 0 - 7 %    BASOPHILS 0 0 - 1 %    IMMATURE GRANULOCYTES 1 (H) 0.0 - 0.5 %    ABS. NEUTROPHILS 3.0 1.8 - 8.0 K/UL    ABS. LYMPHOCYTES 0.8 0.8 - 3.5 K/UL    ABS. MONOCYTES 1.0 0.0 - 1.0 K/UL    ABS.  EOSINOPHILS 0.0 0.0 - 0.4 K/UL    ABS. BASOPHILS 0.0 0.0 - 0.1 K/UL    ABS. IMM. GRANS. 0.0 0.00 - 0.04 K/UL    DF SMEAR SCANNED      RBC COMMENTS NORMOCYTIC, NORMOCHROMIC     METABOLIC PANEL, BASIC    Collection Time: 12/19/21  6:02 AM   Result Value Ref Range    Sodium 128 (L) 136 - 145 mmol/L    Potassium 3.5 3.5 - 5.1 mmol/L    Chloride 91 (L) 97 - 108 mmol/L    CO2 31 21 - 32 mmol/L    Anion gap 6 5 - 15 mmol/L    Glucose 101 (H) 65 - 100 mg/dL    BUN 15 6 - 20 MG/DL    Creatinine 1.12 (H) 0.55 - 1.02 MG/DL    BUN/Creatinine ratio 13 12 - 20      GFR est AA >60 >60 ml/min/1.73m2    GFR est non-AA 51 (L) >60 ml/min/1.73m2    Calcium 8.7 8.5 - 10.1 MG/DL   RBC, ALLOCATE    Collection Time: 12/19/21  7:00 AM   Result Value Ref Range    HISTORY CHECKED?  Historical check performed    EKG, 12 LEAD, INITIAL    Collection Time: 12/19/21  8:08 AM   Result Value Ref Range    Ventricular Rate 101 BPM    Atrial Rate 101 BPM    P-R Interval 168 ms    QRS Duration 90 ms    Q-T Interval 334 ms    QTC Calculation (Bezet) 433 ms    Calculated P Axis 35 degrees    Calculated R Axis 34 degrees    Calculated T Axis 54 degrees    Diagnosis       Sinus tachycardia  Nonspecific T wave abnormality  Abnormal ECG  When compared with ECG of 18-DEC-2021 22:17,  Sinus rhythm has replaced Atrial flutter  Nonspecific T wave abnormality, worse in Anterior leads     METABOLIC PANEL, BASIC    Collection Time: 12/19/21 10:31 AM   Result Value Ref Range    Sodium 127 (L) 136 - 145 mmol/L    Potassium 3.7 3.5 - 5.1 mmol/L    Chloride 91 (L) 97 - 108 mmol/L    CO2 31 21 - 32 mmol/L    Anion gap 5 5 - 15 mmol/L    Glucose 113 (H) 65 - 100 mg/dL    BUN 16 6 - 20 MG/DL    Creatinine 1.27 (H) 0.55 - 1.02 MG/DL    BUN/Creatinine ratio 13 12 - 20      GFR est AA 54 (L) >60 ml/min/1.73m2    GFR est non-AA 44 (L) >60 ml/min/1.73m2    Calcium 9.0 8.5 - 10.1 MG/DL   PROCALCITONIN    Collection Time: 12/19/21 10:31 AM   Result Value Ref Range    Procalcitonin 0.71 ng/mL Urine dipstick:   Lab Results   Component Value Date/Time    Color DARK YELLOW 12/18/2021 10:39 PM    Appearance TURBID (A) 12/18/2021 10:39 PM    Specific gravity 1.025 12/18/2021 10:39 PM    pH (UA) 5.5 12/18/2021 10:39 PM    Protein 100 (A) 12/18/2021 10:39 PM    Glucose Negative 12/18/2021 10:39 PM    Ketone 80 (A) 12/18/2021 10:39 PM    Urobilinogen 1.0 12/18/2021 10:39 PM    Nitrites Negative 12/18/2021 10:39 PM    Leukocyte Esterase TRACE (A) 12/18/2021 10:39 PM    Epithelial cells FEW 12/18/2021 10:39 PM    Bacteria Negative 12/18/2021 10:39 PM    WBC 0-4 12/18/2021 10:39 PM    RBC 0-5 12/18/2021 10:39 PM       I have reviewed the following: All pertinent labs, microbiology data, radiology imaging for my assessment     Medications list Personally Reviewed   [x]      Yes     []               No       Medications:  Prior to Admission medications    Medication Sig Start Date End Date Taking? Authorizing Provider   oxyCODONE-acetaminophen (Percocet) 5-325 mg per tablet Take 1 Tablet by mouth every six (6) hours as needed for Pain for up to 5 days. Max Daily Amount: 4 Tablets. 12/15/21 12/20/21 Yes Analilia Rodriguez MD   naproxen (NAPROSYN) 500 mg tablet Take 500 mg by mouth two (2) times daily (with meals). Yes Provider, Historical   ergocalciferol (Vitamin D2) 1,250 mcg (50,000 unit) capsule Take 50,000 Units by mouth every seven (7) days. Saturday   Yes Provider, Historical   nitroglycerin (NITRODUR) 0.2 mg/hr 0.25 Patches by TransDERmal route daily. APPLIED TO ANKLE   Yes Provider, Historical   omega 3-DHA-EPA-fish oil (Fish OiL) 1,000 mg (120 mg-180 mg) capsule Take 1 Capsule by mouth daily. Yes Provider, Historical   multivitamin (ONE A DAY) tablet Take 1 Tablet by mouth daily. Yes Provider, Historical   elderberry fruit (ELDERBERRY PO) Take 1 Tablet by mouth daily. Yes Provider, Historical   B-complex with vitamin C (VITAMIN B COMPLEX-C PO) Take 1 Tablet by mouth daily.    Yes Provider, Historical   B.infantis-B.ani-B.long-B.bifi (Probiotic 4X) 10-15 mg TbEC Take 1 Tablet by mouth daily. Yes Provider, Historical        Thank you for allowing us to participate in the care of this patient. We will follow patient. Please dont hesitate to call with any questions    Josey Talamantes MD  [facility]   [address] [facility] [address]  Phone - [phone]   Fax - [phone]  www. [email or web address]

## 2021-12-19 NOTE — PROGRESS NOTES
Notified by nursing staff that patient is febrile, tachycardic and requiring oxygen. Recent abdominal Xray consistent with post operative ileus. Given changes in vitals concern for PE vs  pneumonia, CTA ordered   Given recent surgery with extensive lysis of bowel adhesions will also order CT of abdomen and pelvis. Lsiette Tavarez, [address]

## 2021-12-19 NOTE — PROGRESS NOTES
I stopped by to check on patient who was seen by my colleague,Dr Jared Agarwal, early this morning. The hospitalist team was consulted to assist in the management of hyponatremia. Daughter and RN at the bedside. She is receiving blood at this time. Subjectively  -Patient still nauseated but the nausea, vomiting or abdominal distention much improved following insertion of NGT. -She has diffuse abdominal pain  -She says she feels like she is passing gas      Objectively  Vital signs: Temperature 98.4, heart rate 102, /84, SPO2 97% on 2 L/min  Lungs: Basally diminished otherwise symmetrical air entry  Abdomen: NGT to wall suction draining dark green fluid. Abdomen distended, diminished bowel sounds, diffusely tender without rebound or guarding. Ecchymosis on the right lower abdominal wall. Extremity: SCDs on. No edema  CNS: Alert oriented x4. Non focal      Labs reviewed, most recent ones  -Hemoglobin 6.1, she is current receiving blood  -Sodium 128, ashley 124 2200 on 12/18. Potassium 3.5    EKG from this morning: Sinus tachycardia at a rate of 101 with other nonspecific T wave abnormalities    Rapid covid test negative,fully vaccinated. BCX,in process  UA negative for definitive signs of UTI. CT Results  (Last 48 hours)               12/18/21 2350  CTA CHEST W OR W WO CONT Final result    Impression:      1. Cardiorespiratory motion with no acute large/central pulmonary embolus. Bibasilar atelectasis. 2. Recent hysterectomy with pneumoperitoneum and areas of complex fluid/blood in   the central and right pelvis. No evidence of abscess/drainable collection. 3. Dilated and mildly thickened small bowel is most likely represent   postoperative ileus. Nasogastric tube present within the stomach. 4. Small complex 1.1 cm right renal lesion. Recommend nonemergent renal CT or   MRI.        Narrative:  INDICATION: concern for PE vs pneumonia, post op patient       COMPARISON: None       TECHNIQUE:  CTA imaging of the chest,  and CT imaging of the abdomen and pelvis   was performed after the uneventful intravenous administration of contrast   material.  Coronal and sagittal reconstructions were generated. 3D image   postprocessing was performed. CT dose reduction was achieved through use of a   standardized protocol tailored for this examination and automatic exposure   control for dose modulation. FINDINGS:       THYROID: Mild diffuse enlargement and subtle nodularity. MEDIASTINUM/KAMERON: No mass or lymphadenopathy. HEART/PERICARDIUM: Mild cardiomegaly. AORTA: No aneurysm. PULMONARY ARTERIES: Cardiorespiratory motion with no large or central pulmonary   embolus. LUNGS/PLEURA: Bibasilar atelectasis. No pulmonary edema or pleural effusion. No   pneumothorax. BONES: No destructive bone lesion. ADDITIONAL COMMENTS: N/A       LIVER: No mass or biliary dilatation. GALLBLADDER: Unremarkable. SPLEEN: No enlargement or lesion. PANCREAS: No mass or ductal dilatation. ADRENALS: No mass. KIDNEYS: No hydronephrosis. 3.5 cm cyst upper pole right kidney. 1.1 cm complex   lesion posterior mid right kidney. GI TRACT: Nasogastric tube present within the stomach. Dilated, fluid-filled   small bowel loops in the abdomen which are also slightly thickened. Decompressed   terminal ileum. PERITONEUM: Pneumoperitoneum related to recent surgery. Trace ascites. APPENDIX: Not clearly visualized with no secondary signs of appendicitis. RETROPERITONEUM: No lymphadenopathy or aortic aneurysm. URINARY BLADDER: Hahn catheter present. LYMPH NODES: None enlarged. REPRODUCTIVE ORGANS: Recent hysterectomy with ill-defined complex fluid/blood in   the central pelvis measuring 8.9 x 6.7 cm, and in the right upper pelvis   anterior to the psoas muscle 4.5 x 4.9 cm. Extraperitoneal gas. FREE FLUID: Mild presacral edema. BONES: No destructive bone lesion.    ADDITIONAL COMMENTS: N/A.           12/18/21 2350  CT ABD PELV W CONT Final result    Impression:      1. Cardiorespiratory motion with no acute large/central pulmonary embolus. Bibasilar atelectasis. 2. Recent hysterectomy with pneumoperitoneum and areas of complex fluid/blood in   the central and right pelvis. No evidence of abscess/drainable collection. 3. Dilated and mildly thickened small bowel is most likely represent   postoperative ileus. Nasogastric tube present within the stomach. 4. Small complex 1.1 cm right renal lesion. Recommend nonemergent renal CT or   MRI. Narrative:  INDICATION: concern for PE vs pneumonia, post op patient       COMPARISON: None       TECHNIQUE:  CTA imaging of the chest,  and CT imaging of the abdomen and pelvis   was performed after the uneventful intravenous administration of contrast   material.  Coronal and sagittal reconstructions were generated. 3D image   postprocessing was performed. CT dose reduction was achieved through use of a   standardized protocol tailored for this examination and automatic exposure   control for dose modulation. FINDINGS:       THYROID: Mild diffuse enlargement and subtle nodularity. MEDIASTINUM/KAMERON: No mass or lymphadenopathy. HEART/PERICARDIUM: Mild cardiomegaly. AORTA: No aneurysm. PULMONARY ARTERIES: Cardiorespiratory motion with no large or central pulmonary   embolus. LUNGS/PLEURA: Bibasilar atelectasis. No pulmonary edema or pleural effusion. No   pneumothorax. BONES: No destructive bone lesion. ADDITIONAL COMMENTS: N/A       LIVER: No mass or biliary dilatation. GALLBLADDER: Unremarkable. SPLEEN: No enlargement or lesion. PANCREAS: No mass or ductal dilatation. ADRENALS: No mass. KIDNEYS: No hydronephrosis. 3.5 cm cyst upper pole right kidney. 1.1 cm complex   lesion posterior mid right kidney. GI TRACT: Nasogastric tube present within the stomach.  Dilated, fluid-filled   small bowel loops in the abdomen which are also slightly thickened. Decompressed   terminal ileum. PERITONEUM: Pneumoperitoneum related to recent surgery. Trace ascites. APPENDIX: Not clearly visualized with no secondary signs of appendicitis. RETROPERITONEUM: No lymphadenopathy or aortic aneurysm. URINARY BLADDER: Hahn catheter present. LYMPH NODES: None enlarged. REPRODUCTIVE ORGANS: Recent hysterectomy with ill-defined complex fluid/blood in   the central pelvis measuring 8.9 x 6.7 cm, and in the right upper pelvis   anterior to the psoas muscle 4.5 x 4.9 cm. Extraperitoneal gas. FREE FLUID: Mild presacral edema. BONES: No destructive bone lesion. ADDITIONAL COMMENTS: N/A. Assessment and plan    #1. Acute hyponatremia, clinically hypovolemic. Timmy sodium 124, most recent 128. --Continue current rate of IV fluid. BMP due at 10 AM, will then adjust treatment based on sodium level. Monitor BMP every 4 hourly, closely watch for overcorrection  --I would like to have nephrology on board, patient with acute postoperative hyponatremia need close attention. #2. Sepsis (as manifested by fever, tachycardia) lactic acid was low, 0.8.  UA is negative for UTI. Rapid Covid test is negative, she is fully vaccinated. Blood culture in process. CT chest negative for obvious findings of pneumonia. Continue Zosyn. #3.  Acute blood loss anemia: Patient receiving blood at this time. Recommend to monitor H&H and transfuse as needed for Hb less than 7, management per primary team  #4. Postoperative urinary retention, Hahn catheter in place, management per primary team  #5. Postoperative ileus: Management per primary team    See note from the initial consult by Dr. Pb Rodriguez for other details. Care plan discussed with patient, her daughter, RN.

## 2021-12-19 NOTE — PROGRESS NOTES
Imaging reviewed   WBC count, lactic acid and UA WNL  NG tube in place   1,000 cc fluid bolus   One time dose of Toradol for pain control   Rectal tylenol PRN for fever   Avoid narcotics if possible given ileus   Nursing asked to up date vital signs

## 2021-12-19 NOTE — CONSULTS
[facility] [facility]  Hospitalist Group    Hospitalist Consult  Primary Care Provider: Brian Reid NP  Consult requested by: Rosalinda Faulkner MD    Subjective:     Cortez Richardson is a 45 y.o. female with a pmhx obesity who was admitted on December 15 for laparoscopic supracervical hysterectomy due to uterine fibroids. Her postoperative course was complicated by ileus, requiring NG tube due to intractable vomiting. He subsequently developed a fever to 100.8,tachycardic to the 130s, nocturnal hypoxia to 88%. She is also having a slowly decreasing hemoglobin from 8.9-7.2, and worsening hyponatremia from 133>124. CT chest, abdomen, and pelvis showed no drainable fluid collection, and was negative for PE. Patient had received 1 L normal saline bolus, and has maintenance fluids running at 125 cc/h. Of note she was on Zosyn postoperatively, but this was stopped on yesterday. She has been vaccinated x2 for COVID with the last vaccination occurring June 2021. She has a Hahn in place, but denies urinary complaints. Review of Systems:    All othre review of systems were  negative except for that written in the History of Present Illness. Past Medical History:   Diagnosis Date    Arthritis     Knee injury     RIGHT    Tendonitis     LEFT ANKLE      Past Surgical History:   Procedure Laterality Date    HX ADENOIDECTOMY  1975    HX HEENT  1975    BMT    HX OTHER SURGICAL  2005    RIGHT ANKLE     Prior to Admission medications    Medication Sig Start Date End Date Taking? Authorizing Provider   oxyCODONE-acetaminophen (Percocet) 5-325 mg per tablet Take 1 Tablet by mouth every six (6) hours as needed for Pain for up to 5 days. Max Daily Amount: 4 Tablets. 12/15/21 12/20/21 Yes Devin Palafox MD   naproxen (NAPROSYN) 500 mg tablet Take 500 mg by mouth two (2) times daily (with meals).    Yes Provider, Historical   ergocalciferol (Vitamin D2) 1,250 mcg (50,000 unit) capsule Take 50,000 Units by mouth every seven (7) days. Saturday   Yes Provider, Historical   nitroglycerin (NITRODUR) 0.2 mg/hr 0.25 Patches by TransDERmal route daily. APPLIED TO ANKLE   Yes Provider, Historical   omega 3-DHA-EPA-fish oil (Fish OiL) 1,000 mg (120 mg-180 mg) capsule Take 1 Capsule by mouth daily. Yes Provider, Historical   multivitamin (ONE A DAY) tablet Take 1 Tablet by mouth daily. Yes Provider, Historical   elderberry fruit (ELDERBERRY PO) Take 1 Tablet by mouth daily. Yes Provider, Historical   B-complex with vitamin C (VITAMIN B COMPLEX-C PO) Take 1 Tablet by mouth daily. Yes Provider, Historical   B.infantis-B.ani-B.long-B.bifi (Probiotic 4X) 10-15 mg TbEC Take 1 Tablet by mouth daily. Yes Provider, Historical     No Known Allergies   Family History   Problem Relation Age of Onset    No Known Problems Mother     Cancer Father 62        agent orange    Alzheimer's Disease Paternal Grandmother     Other Sister         FIBROMYALGIA, TRIGEMINAL NEUROLOGIA    Anesth Problems Neg Hx         SOCIAL HISTORY:  Patient resides at Home. Patient ambulates independently  Objective:       Physical Exam:   Visit Vitals  /75 (BP 1 Location: Left lower arm, BP Patient Position: At rest)   Pulse (!) 115   Temp 98.3 °F (36.8 °C)   Resp 20   Ht 5' (1.524 m)   Wt 127 kg (280 lb)   SpO2 95% Comment: on 2L, 88 on RA   BMI 54.68 kg/m²     General:  Alert, cooperative, no distress, appears stated age. Head:  Normocephalic, without obvious abnormality, atraumatic. Eyes:  Conjunctivae/corneas clear. EOMs intact. Ears:  Normal TMs and external ear canals both ears. Nose: Nares normal. Septum midline. Throat: Lips, mucosa, and tongue normal.    Neck: Supple, symmetrical, trachea midline   Back:   Symmetric, no curvature. ROM normal.    Lungs:   Clear to auscultation bilaterally. Chest wall:  No tenderness or deformity. Heart:  Regular rate and rhythm, S1, S2 normal, no murmur, click, rub or gallop. Abdomen:   Soft, non-tender. Bowel sounds normal. No masses,  No organomegaly. Extremities: Extremities normal, atraumatic, no cyanosis or edema. Pulses: 2+ radial pulses   Skin: Skin color, texture, turgor normal. No rashes or lesions. Data Review: All diagnostic labs and studies have been reviewed. Assessment:   Leon Rhoades is a 45 y.o. female who presents with uterine fibroids, and is postop day number 4S/PE supracervical hysterectomy. We are asked to see the patient in consult to assist in managing hypovolemic, hypoosmolar hyponatremia, and fever. Active Problems:    Fibroid uterus (12/15/2021)        Plan:     #Acute Hypovolemic hypoosmolar hyponatremia  -Patient with history of intractable vomiting secondary to postoperative ileus. Her sodium decreased from 133-124, urine osmolarity 260. Urine sodium is less than 5, all findings suggestive of hypovolemic hypoosmolar hyponatremia secondary to dehydration from intractable vomiting.    -Patient has received 1 L normal saline, and was on 125 cc/h maintenance fluid. Sodium has improved from 124-128. Will change fluid to D5 normal saline, and slow rate to 75 cc/h to avoid overcorrection, though this is not as much of a concern given that the hyponatremia is acute. -Q4h BMP    #Fever  -T-max 100.8. Patient is not complaining of urinary, or respiratory symptoms.   Her Zosyn was discontinued on 12/18.    -Blood cultures, and UA with culture have been collected, will follow  -There are no infiltrates on CT thorax  -We will restart Zosyn pending culture results, and check rapid Covid, and flu in the interim    #Anemia  -Hemoglobin has decreased from 8.9 admission to 7.2 postoperatively  -Continue to monitor hemoglobin, transfuse for hemoglobin less than 7    #tachycardia  -Patient has been tachycardic from the low 100-1 30s  -EKG has been ordered, and is pending to confirm sinus  -Likely appropriate HR response to underlying conditions of hypovolemia, anemia, and possible infection    FEN: NPO, NG tube, maintenance IVF  dvt ppx: SCD  Code: Full    Signed By: Mary Jo Pearson MD     Date of Service:  12/19/2021

## 2021-12-19 NOTE — PROGRESS NOTES
Worsening hyponatremia on most recent labs  I spoke to Dr. Mame Posadas MD from hospital medicine directly.    Consult placed

## 2021-12-20 LAB
ABO + RH BLD: NORMAL
ANION GAP SERPL CALC-SCNC: 5 MMOL/L (ref 5–15)
ANION GAP SERPL CALC-SCNC: 5 MMOL/L (ref 5–15)
BASOPHILS # BLD: 0 K/UL (ref 0–0.1)
BASOPHILS NFR BLD: 0 % (ref 0–1)
BLD PROD TYP BPU: NORMAL
BLOOD GROUP ANTIBODIES SERPL: NORMAL
BPU ID: NORMAL
BUN SERPL-MCNC: 11 MG/DL (ref 6–20)
BUN SERPL-MCNC: 13 MG/DL (ref 6–20)
BUN/CREAT SERPL: 12 (ref 12–20)
BUN/CREAT SERPL: 12 (ref 12–20)
CALCIUM SERPL-MCNC: 8.5 MG/DL (ref 8.5–10.1)
CALCIUM SERPL-MCNC: 8.7 MG/DL (ref 8.5–10.1)
CHLORIDE SERPL-SCNC: 100 MMOL/L (ref 97–108)
CHLORIDE SERPL-SCNC: 96 MMOL/L (ref 97–108)
CO2 SERPL-SCNC: 31 MMOL/L (ref 21–32)
CO2 SERPL-SCNC: 34 MMOL/L (ref 21–32)
CREAT SERPL-MCNC: 0.93 MG/DL (ref 0.55–1.02)
CREAT SERPL-MCNC: 1.08 MG/DL (ref 0.55–1.02)
CROSSMATCH RESULT,%XM: NORMAL
DIFFERENTIAL METHOD BLD: ABNORMAL
EOSINOPHIL # BLD: 0.2 K/UL (ref 0–0.4)
EOSINOPHIL NFR BLD: 4 % (ref 0–7)
ERYTHROCYTE [DISTWIDTH] IN BLOOD BY AUTOMATED COUNT: 13.7 % (ref 11.5–14.5)
GLUCOSE SERPL-MCNC: 108 MG/DL (ref 65–100)
GLUCOSE SERPL-MCNC: 129 MG/DL (ref 65–100)
HCT VFR BLD AUTO: 21.2 % (ref 35–47)
HGB BLD-MCNC: 6.9 G/DL (ref 11.5–16)
IMM GRANULOCYTES # BLD AUTO: 0.1 K/UL (ref 0–0.04)
IMM GRANULOCYTES NFR BLD AUTO: 1 % (ref 0–0.5)
LYMPHOCYTES # BLD: 0.7 K/UL (ref 0.8–3.5)
LYMPHOCYTES NFR BLD: 12 % (ref 12–49)
MCH RBC QN AUTO: 29.9 PG (ref 26–34)
MCHC RBC AUTO-ENTMCNC: 32.5 G/DL (ref 30–36.5)
MCV RBC AUTO: 91.8 FL (ref 80–99)
MONOCYTES # BLD: 0.9 K/UL (ref 0–1)
MONOCYTES NFR BLD: 16 % (ref 5–13)
NEUTS SEG # BLD: 4 K/UL (ref 1.8–8)
NEUTS SEG NFR BLD: 67 % (ref 32–75)
NRBC # BLD: 0.09 K/UL (ref 0–0.01)
NRBC BLD-RTO: 1.5 PER 100 WBC
PLATELET # BLD AUTO: 229 K/UL (ref 150–400)
PMV BLD AUTO: 10.5 FL (ref 8.9–12.9)
POTASSIUM SERPL-SCNC: 3 MMOL/L (ref 3.5–5.1)
POTASSIUM SERPL-SCNC: 4 MMOL/L (ref 3.5–5.1)
RBC # BLD AUTO: 2.31 M/UL (ref 3.8–5.2)
RBC MORPH BLD: ABNORMAL
SODIUM SERPL-SCNC: 135 MMOL/L (ref 136–145)
SODIUM SERPL-SCNC: 136 MMOL/L (ref 136–145)
SPECIMEN EXP DATE BLD: NORMAL
STATUS OF UNIT,%ST: NORMAL
UNIT DIVISION, %UDIV: 0
WBC # BLD AUTO: 5.9 K/UL (ref 3.6–11)

## 2021-12-20 PROCEDURE — 74011250636 HC RX REV CODE- 250/636: Performed by: FAMILY MEDICINE

## 2021-12-20 PROCEDURE — 74011250636 HC RX REV CODE- 250/636: Performed by: SPECIALIST

## 2021-12-20 PROCEDURE — 74011250637 HC RX REV CODE- 250/637: Performed by: SPECIALIST

## 2021-12-20 PROCEDURE — P9045 ALBUMIN (HUMAN), 5%, 250 ML: HCPCS | Performed by: INTERNAL MEDICINE

## 2021-12-20 PROCEDURE — 74011250636 HC RX REV CODE- 250/636: Performed by: HOSPITALIST

## 2021-12-20 PROCEDURE — 80048 BASIC METABOLIC PNL TOTAL CA: CPT

## 2021-12-20 PROCEDURE — 36415 COLL VENOUS BLD VENIPUNCTURE: CPT

## 2021-12-20 PROCEDURE — 65410000002 HC RM PRIVATE OB

## 2021-12-20 PROCEDURE — 74011250636 HC RX REV CODE- 250/636: Performed by: INTERNAL MEDICINE

## 2021-12-20 PROCEDURE — 74011250636 HC RX REV CODE- 250/636: Performed by: OBSTETRICS & GYNECOLOGY

## 2021-12-20 PROCEDURE — 85025 COMPLETE CBC W/AUTO DIFF WBC: CPT

## 2021-12-20 PROCEDURE — 74011000258 HC RX REV CODE- 258: Performed by: FAMILY MEDICINE

## 2021-12-20 RX ORDER — POTASSIUM CHLORIDE 7.45 MG/ML
10 INJECTION INTRAVENOUS
Status: COMPLETED | OUTPATIENT
Start: 2021-12-20 | End: 2021-12-20

## 2021-12-20 RX ORDER — ALBUMIN HUMAN 50 G/1000ML
12.5 SOLUTION INTRAVENOUS ONCE
Status: COMPLETED | OUTPATIENT
Start: 2021-12-20 | End: 2021-12-20

## 2021-12-20 RX ORDER — DEXTROSE MONOHYDRATE, SODIUM CHLORIDE, SODIUM LACTATE, POTASSIUM CHLORIDE, CALCIUM CHLORIDE 5; 600; 310; 179; 20 G/100ML; MG/100ML; MG/100ML; MG/100ML; MG/100ML
INJECTION, SOLUTION INTRAVENOUS CONTINUOUS
Status: DISCONTINUED | OUTPATIENT
Start: 2021-12-20 | End: 2021-12-24 | Stop reason: HOSPADM

## 2021-12-20 RX ADMIN — POTASSIUM CHLORIDE 10 MEQ: 7.46 INJECTION, SOLUTION INTRAVENOUS at 08:50

## 2021-12-20 RX ADMIN — POTASSIUM CHLORIDE 10 MEQ: 7.46 INJECTION, SOLUTION INTRAVENOUS at 14:55

## 2021-12-20 RX ADMIN — PIPERACILLIN SODIUM AND TAZOBACTAM SODIUM 3.38 G: 3; .375 INJECTION, POWDER, LYOPHILIZED, FOR SOLUTION INTRAVENOUS at 11:22

## 2021-12-20 RX ADMIN — ALBUMIN (HUMAN) 12.5 G: 12.5 INJECTION, SOLUTION INTRAVENOUS at 12:28

## 2021-12-20 RX ADMIN — PIPERACILLIN SODIUM AND TAZOBACTAM SODIUM 3.38 G: 3; .375 INJECTION, POWDER, LYOPHILIZED, FOR SOLUTION INTRAVENOUS at 18:58

## 2021-12-20 RX ADMIN — ONDANSETRON 4 MG: 2 INJECTION INTRAMUSCULAR; INTRAVENOUS at 08:57

## 2021-12-20 RX ADMIN — PIPERACILLIN SODIUM AND TAZOBACTAM SODIUM 3.38 G: 3; .375 INJECTION, POWDER, LYOPHILIZED, FOR SOLUTION INTRAVENOUS at 02:43

## 2021-12-20 RX ADMIN — HYDROMORPHONE HYDROCHLORIDE 1 MG: 1 INJECTION, SOLUTION INTRAMUSCULAR; INTRAVENOUS; SUBCUTANEOUS at 08:49

## 2021-12-20 RX ADMIN — LORAZEPAM 0.5 MG: 2 INJECTION INTRAMUSCULAR; INTRAVENOUS at 02:58

## 2021-12-20 RX ADMIN — POTASSIUM CHLORIDE 10 MEQ: 7.46 INJECTION, SOLUTION INTRAVENOUS at 18:59

## 2021-12-20 RX ADMIN — LORAZEPAM 0.5 MG: 2 INJECTION INTRAMUSCULAR; INTRAVENOUS at 21:51

## 2021-12-20 RX ADMIN — POTASSIUM CHLORIDE 10 MEQ: 7.46 INJECTION, SOLUTION INTRAVENOUS at 10:58

## 2021-12-20 RX ADMIN — POTASSIUM CHLORIDE, SODIUM CHLORIDE, CALCIUM CHLORIDE, SODIUM LACTATE, AND DEXTROSE MONOHYDRATE: 1.79; 6; .2; 3.1; 5 INJECTION, SOLUTION INTRAVENOUS at 10:57

## 2021-12-20 NOTE — PROGRESS NOTES
[facility]   [address] [facility], [address] [place] [address]  Phone: [phone]   [phone]    [email or web address]     Nephrology Progress Note    Patient Name : Bettie Hogan      : 1970     MRN : [id number]  Date of Admission : 12/15/2021  Date of Servive : 21    CC: Follow up for Hyponatremia     Assessment and Plan   Acute hyponatremia:  -Hypovolemic hyponatremia   Na improving w/ isotonic fluids   - changed IVF to D5LR w/ kcl   - labs daily     Oliguria :  - albumin bolus now  - increase rate of IVF     Hypokalemia :  - replacement ordered   - adjusted IVF   - may need TPN/PPN     S/p laparoscopic JOSE plus BSO, extensive lysis of adhesions  -Postop care per GYN     Postop SB ileus  -NGT for suction  -IVF as above     Postop blood loss anemia     Fever  -Work-up per primary team        Care Plan discussed with: Patient and RN       Interval History:  Seen and examined. Having lots of NGT drainage. Developed oliguric, alkalosis and hypokalemia. Na better. Denies any abdo pain     Review of Systems: A comprehensive review of systems was negative except for that written in the HPI.     Current Medications:   Current Facility-Administered Medications   Medication Dose Route Frequency    potassium chloride 10 mEq in 100 ml IVPB  10 mEq IntraVENous Q1H    dextrose 5% - LR with KCl 20 mEq/L infusion   IntraVENous CONTINUOUS    albumin human 5% (BUMINATE) solution 12.5 g  12.5 g IntraVENous ONCE    acetaminophen (TYLENOL) suppository 1,000 mg  1,000 mg Rectal Q6H PRN    piperacillin-tazobactam (ZOSYN) 3.375 g in 0.9% sodium chloride (MBP/ADV) 100 mL MBP  3.375 g IntraVENous Q8H    phenol throat spray (CHLORASEPTIC) 1 Spray  1 Spray Oral PRN    0.9% sodium chloride infusion  125 mL/hr IntraVENous PRN    traMADoL (ULTRAM) tablet 50 mg  50 mg Oral Q6H PRN    LORazepam (ATIVAN) injection 0.5 mg  0.5 mg IntraVENous Q8H PRN    prochlorperazine (COMPAZINE) with saline injection 10 mg  10 mg IntraVENous Q6H PRN    pantoprazole (PROTONIX) tablet 40 mg  40 mg Oral ACB    0.9% sodium chloride infusion 250 mL  250 mL IntraVENous PRN    docusate sodium (COLACE) capsule 100 mg  100 mg Oral BID    bisacodyL (DULCOLAX) suppository 10 mg  10 mg Rectal DAILY PRN    sodium chloride (NS) flush 5-40 mL  5-40 mL IntraVENous Q8H    sodium chloride (NS) flush 5-40 mL  5-40 mL IntraVENous PRN    HYDROmorphone (DILAUDID) injection 1 mg  1 mg IntraVENous Q4H PRN    ondansetron (ZOFRAN) injection 4 mg  4 mg IntraVENous Q4H PRN    diphenhydrAMINE (BENADRYL) injection 12.5 mg  12.5 mg IntraVENous Q4H PRN    nitroglycerin (NITRODUR) 0.2 mg/hr patch 1 Patch  1 Patch TransDERmal DAILY    simethicone (MYLICON) tablet 80 mg  80 mg Oral TID PRN      No Known Allergies    Objective:  Vitals:    Vitals:    12/19/21 1252 12/19/21 2043 12/20/21 0020 12/20/21 0820   BP: (!) 147/88 (!) 145/82 (!) 146/77 (!) 153/73   Pulse: (!) 102 95 89 89   Resp: 20 20 18 20   Temp: 97.8 °F (36.6 °C) 98.7 °F (37.1 °C) 99.1 °F (37.3 °C) 97.9 °F (36.6 °C)   SpO2: 99% 94% 99% 98%   Weight:       Height:         Intake and Output:  12/20 0701 - 12/20 1900  In: -   Out: 390 [Urine:150]  12/18 1901 - 12/20 0700  In: 1500 [I.V.:1000]  Out: 9402 [Urine:1035]    Physical Examination:  General: obese  HEENT: NGT  Neck: Supple,no mass palpable  Lungs : CTA  CVS: RRR, S1 S2 normal, No murmur   Abdomen: Soft, NT, BS +  Extremities: no Edema  Skin: No rash or lesions. MS: No joint swelling, erythema, warmth  Neurologic: non focal, AAO x 3  Psych: normal affect/     []    High complexity decision making was performed  []    Patient is at high-risk of decompensation with multiple organ involvement    Lab Data Personally Reviewed: I have reviewed all the pertinent labs, microbiology data and radiology studies during assessment.     Recent Labs     12/20/21  0252 12/19/21  1837 12/19/21  1604 12/19/21  1031 12/19/21  0602 12/19/21 0220 12/18/21 2203   * 130* 129* 127* 128*   < > 124*   K 3.0* 3.2* 3.2* 3.7 3.5   < > 4.3   CL 96* 93* 93* 91* 91*   < > 89*   CO2 34* 31 29 31 31   < > 29   * 130* 132* 113* 101*   < > 124*   BUN 13 15 14 16 15   < > 13   CREA 1.08* 1.14* 1.10* 1.27* 1.12*   < > 1.18*   CA 8.5 8.9 9.0 9.0 8.7   < > 9.7   PHOS  --  2.8  --   --   --   --   --    ALB  --  2.3*  --   --   --   --  2.6*   ALT  --   --   --   --   --   --  15    < > = values in this interval not displayed.      Recent Labs     12/19/21  0602 12/18/21 2203   WBC 4.8 5.7   HGB 6.1* 7.2*   HCT 18.3* 21.1*    256     No results found for: [facility]  Lab Results   Component Value Date/Time    Culture result: NO GROWTH 2 DAYS 12/18/2021 10:03 PM     Recent Results (from the past 24 hour(s))   METABOLIC PANEL, BASIC    Collection Time: 12/19/21 10:31 AM   Result Value Ref Range    Sodium 127 (L) 136 - 145 mmol/L    Potassium 3.7 3.5 - 5.1 mmol/L    Chloride 91 (L) 97 - 108 mmol/L    CO2 31 21 - 32 mmol/L    Anion gap 5 5 - 15 mmol/L    Glucose 113 (H) 65 - 100 mg/dL    BUN 16 6 - 20 MG/DL    Creatinine 1.27 (H) 0.55 - 1.02 MG/DL    BUN/Creatinine ratio 13 12 - 20      GFR est AA 54 (L) >60 ml/min/1.73m2    GFR est non-AA 44 (L) >60 ml/min/1.73m2    Calcium 9.0 8.5 - 10.1 MG/DL   PROCALCITONIN    Collection Time: 12/19/21 10:31 AM   Result Value Ref Range    Procalcitonin 0.71 ng/mL   TSH 3RD GENERATION    Collection Time: 12/19/21 10:31 AM   Result Value Ref Range    TSH 1.31 0.36 - 3.74 uIU/mL   CORTISOL, AM    Collection Time: 12/19/21 10:31 AM   Result Value Ref Range    Cortisol, a.m. 41.5 (H) 4.30 - 02.21 ug/dL   METABOLIC PANEL, BASIC    Collection Time: 12/19/21  4:04 PM   Result Value Ref Range    Sodium 129 (L) 136 - 145 mmol/L    Potassium 3.2 (L) 3.5 - 5.1 mmol/L    Chloride 93 (L) 97 - 108 mmol/L    CO2 29 21 - 32 mmol/L    Anion gap 7 5 - 15 mmol/L    Glucose 132 (H) 65 - 100 mg/dL    BUN 14 6 - 20 MG/DL    Creatinine 1.10 (H) 0.55 - 1.02 MG/DL    BUN/Creatinine ratio 13 12 - 20      GFR est AA >60 >60 ml/min/1.73m2    GFR est non-AA 52 (L) >60 ml/min/1.73m2    Calcium 9.0 8.5 - 10.1 MG/DL   RENAL FUNCTION PANEL    Collection Time: 12/19/21  6:37 PM   Result Value Ref Range    Sodium 130 (L) 136 - 145 mmol/L    Potassium 3.2 (L) 3.5 - 5.1 mmol/L    Chloride 93 (L) 97 - 108 mmol/L    CO2 31 21 - 32 mmol/L    Anion gap 6 5 - 15 mmol/L    Glucose 130 (H) 65 - 100 mg/dL    BUN 15 6 - 20 MG/DL    Creatinine 1.14 (H) 0.55 - 1.02 MG/DL    BUN/Creatinine ratio 13 12 - 20      GFR est AA >60 >60 ml/min/1.73m2    GFR est non-AA 50 (L) >60 ml/min/1.73m2    Calcium 8.9 8.5 - 10.1 MG/DL    Phosphorus 2.8 2.6 - 4.7 MG/DL    Albumin 2.3 (L) 3.5 - 5.0 g/dL   SODIUM, UR, RANDOM    Collection Time: 12/19/21  7:03 PM   Result Value Ref Range    Sodium,urine random 14 MMOL/L   OSMOLALITY, UR    Collection Time: 12/19/21  7:03 PM   Result Value Ref Range    Osmolality,urine 697 MOSM/kg H2O   POTASSIUM, UR, RANDOM    Collection Time: 12/19/21  7:03 PM   Result Value Ref Range    Potassium urine, random 22 MMOL/L   METABOLIC PANEL, BASIC    Collection Time: 12/20/21  2:52 AM   Result Value Ref Range    Sodium 135 (L) 136 - 145 mmol/L    Potassium 3.0 (L) 3.5 - 5.1 mmol/L    Chloride 96 (L) 97 - 108 mmol/L    CO2 34 (H) 21 - 32 mmol/L    Anion gap 5 5 - 15 mmol/L    Glucose 129 (H) 65 - 100 mg/dL    BUN 13 6 - 20 MG/DL    Creatinine 1.08 (H) 0.55 - 1.02 MG/DL    BUN/Creatinine ratio 12 12 - 20      GFR est AA >60 >60 ml/min/1.73m2    GFR est non-AA 53 (L) >60 ml/min/1.73m2    Calcium 8.5 8.5 - 10.1 MG/DL           I have reviewed the flowsheets. Chart and Pertinent Notes have been reviewed. No change in PMH ,family and social history from Consult note.       Alex Moncada [facility]

## 2021-12-20 NOTE — PROGRESS NOTES
Progress note. 12/20/21  Erasto Linares MD          Sidney John is a 45 y.o. female admitted on December 15 for laparoscopic supracervical hysterectomy due to uterine fibroids. Her postoperative course was complicated by ileus, requiring NG tube due to intractable vomiting. He subsequently developed a fever to 100.8,tachycardic to the 130s, nocturnal hypoxia to 88%. She also developed acute hyponatremia and hemoglobin is declining. Subjectively  -She is feeling and looking better today. She says she is passing gas. No fever. Assessment and plan    #1. Acute hyponatremia, clinically hypovolemic. Timmy sodium 124, most recent 128. ..>127. ..>129. ..>130. ..>135  --Nephrology following, appreciate assistance. --IV fluids switched to D5-LR with KCl 20 M EQ/liter, 150 mill per hour  --Monitor sodium. #2. Sepsis (as manifested by fever, tachycardia) lactic acid was low, 0.8.  UA is negative for UTI. Rapid Covid test is negative, she is fully vaccinated. Blood culture negative thus far  -- CT chest negative for obvious findings of pneumonia. --On Zosyn. #3.  Acute blood loss anemia: Received a unit of blood on 12/20, Hb today 6.9. Management per primary team  #4. Postoperative urinary retention, Hahn catheter in place, management per primary team  #5. Postoperative ileus: Being managed conservatively, n.p.o., NGT. Management per primary team          Physical exam    Objectively  Vital signs: VSS  HEENT: NGT to suction. Lungs: Basally diminished otherwise symmetrical air entry  Abdomen: NGT to wall suction draining dark green fluid. Abdomen distended, diminished bowel sounds, diffusely tender without rebound or guarding. Ecchymosis on the right lower abdominal wall. Extremity: SCDs on. No edema  CNS: Alert oriented x4.   Non focal      Labs reviewed, most recent ones      CBC WITH AUTOMATED DIFF    Collection Time: 12/20/21 10:05 AM   Result Value Ref Range    WBC 5.9 3.6 - 11.0 K/uL RBC 2.31 (L) 3.80 - 5.20 M/uL    HGB 6.9 (L) 11.5 - 16.0 g/dL    HCT 21.2 (L) 35.0 - 47.0 %    MCV 91.8 80.0 - 99.0 FL    MCH 29.9 26.0 - 34.0 PG    MCHC 32.5 30.0 - 36.5 g/dL    RDW 13.7 11.5 - 14.5 %    PLATELET 013 338 - 273 K/uL    MPV 10.5 8.9 - 12.9 FL    NRBC 1.5 (H) 0  WBC    ABSOLUTE NRBC 0.09 (H) 0.00 - 0.01 K/uL    NEUTROPHILS 67 32 - 75 %    LYMPHOCYTES 12 12 - 49 %    MONOCYTES 16 (H) 5 - 13 %    EOSINOPHILS 4 0 - 7 %    BASOPHILS 0 0 - 1 %    IMMATURE GRANULOCYTES 1 (H) 0.0 - 0.5 %    ABS. NEUTROPHILS 4.0 1.8 - 8.0 K/UL    ABS. LYMPHOCYTES 0.7 (L) 0.8 - 3.5 K/UL    ABS. MONOCYTES 0.9 0.0 - 1.0 K/UL    ABS. EOSINOPHILS 0.2 0.0 - 0.4 K/UL    ABS. BASOPHILS 0.0 0.0 - 0.1 K/UL    ABS. IMM. GRANS. 0.1 (H) 0.00 - 0.04 K/UL    DF SMEAR SCANNED      RBC COMMENTS NORMOCYTIC, NORMOCHROMIC         EKG from this morning: Sinus tachycardia at a rate of 101 with other nonspecific T wave abnormalities    Rapid covid test negative,fully vaccinated. BCX,in process  UA negative for definitive signs of UTI. CT Results  (Last 48 hours)               12/18/21 2350  CTA CHEST W OR W WO CONT Final result    Impression:      1. Cardiorespiratory motion with no acute large/central pulmonary embolus. Bibasilar atelectasis. 2. Recent hysterectomy with pneumoperitoneum and areas of complex fluid/blood in   the central and right pelvis. No evidence of abscess/drainable collection. 3. Dilated and mildly thickened small bowel is most likely represent   postoperative ileus. Nasogastric tube present within the stomach. 4. Small complex 1.1 cm right renal lesion. Recommend nonemergent renal CT or   MRI.        Narrative:  INDICATION: concern for PE vs pneumonia, post op patient       COMPARISON: None       TECHNIQUE:  CTA imaging of the chest,  and CT imaging of the abdomen and pelvis   was performed after the uneventful intravenous administration of contrast   material.  Coronal and sagittal reconstructions were generated. 3D image   postprocessing was performed. CT dose reduction was achieved through use of a   standardized protocol tailored for this examination and automatic exposure   control for dose modulation. FINDINGS:       THYROID: Mild diffuse enlargement and subtle nodularity. MEDIASTINUM/KAMERON: No mass or lymphadenopathy. HEART/PERICARDIUM: Mild cardiomegaly. AORTA: No aneurysm. PULMONARY ARTERIES: Cardiorespiratory motion with no large or central pulmonary   embolus. LUNGS/PLEURA: Bibasilar atelectasis. No pulmonary edema or pleural effusion. No   pneumothorax. BONES: No destructive bone lesion. ADDITIONAL COMMENTS: N/A       LIVER: No mass or biliary dilatation. GALLBLADDER: Unremarkable. SPLEEN: No enlargement or lesion. PANCREAS: No mass or ductal dilatation. ADRENALS: No mass. KIDNEYS: No hydronephrosis. 3.5 cm cyst upper pole right kidney. 1.1 cm complex   lesion posterior mid right kidney. GI TRACT: Nasogastric tube present within the stomach. Dilated, fluid-filled   small bowel loops in the abdomen which are also slightly thickened. Decompressed   terminal ileum. PERITONEUM: Pneumoperitoneum related to recent surgery. Trace ascites. APPENDIX: Not clearly visualized with no secondary signs of appendicitis. RETROPERITONEUM: No lymphadenopathy or aortic aneurysm. URINARY BLADDER: Hahn catheter present. LYMPH NODES: None enlarged. REPRODUCTIVE ORGANS: Recent hysterectomy with ill-defined complex fluid/blood in   the central pelvis measuring 8.9 x 6.7 cm, and in the right upper pelvis   anterior to the psoas muscle 4.5 x 4.9 cm. Extraperitoneal gas. FREE FLUID: Mild presacral edema. BONES: No destructive bone lesion. ADDITIONAL COMMENTS: N/A.           12/18/21 2350  CT ABD PELV W CONT Final result    Impression:      1. Cardiorespiratory motion with no acute large/central pulmonary embolus. Bibasilar atelectasis.    2. Recent hysterectomy with pneumoperitoneum and areas of complex fluid/blood in   the central and right pelvis. No evidence of abscess/drainable collection. 3. Dilated and mildly thickened small bowel is most likely represent   postoperative ileus. Nasogastric tube present within the stomach. 4. Small complex 1.1 cm right renal lesion. Recommend nonemergent renal CT or   MRI. Narrative:  INDICATION: concern for PE vs pneumonia, post op patient       COMPARISON: None       TECHNIQUE:  CTA imaging of the chest,  and CT imaging of the abdomen and pelvis   was performed after the uneventful intravenous administration of contrast   material.  Coronal and sagittal reconstructions were generated. 3D image   postprocessing was performed. CT dose reduction was achieved through use of a   standardized protocol tailored for this examination and automatic exposure   control for dose modulation. FINDINGS:       THYROID: Mild diffuse enlargement and subtle nodularity. MEDIASTINUM/KAMERON: No mass or lymphadenopathy. HEART/PERICARDIUM: Mild cardiomegaly. AORTA: No aneurysm. PULMONARY ARTERIES: Cardiorespiratory motion with no large or central pulmonary   embolus. LUNGS/PLEURA: Bibasilar atelectasis. No pulmonary edema or pleural effusion. No   pneumothorax. BONES: No destructive bone lesion. ADDITIONAL COMMENTS: N/A       LIVER: No mass or biliary dilatation. GALLBLADDER: Unremarkable. SPLEEN: No enlargement or lesion. PANCREAS: No mass or ductal dilatation. ADRENALS: No mass. KIDNEYS: No hydronephrosis. 3.5 cm cyst upper pole right kidney. 1.1 cm complex   lesion posterior mid right kidney. GI TRACT: Nasogastric tube present within the stomach. Dilated, fluid-filled   small bowel loops in the abdomen which are also slightly thickened. Decompressed   terminal ileum. PERITONEUM: Pneumoperitoneum related to recent surgery. Trace ascites. APPENDIX: Not clearly visualized with no secondary signs of appendicitis. RETROPERITONEUM: No lymphadenopathy or aortic aneurysm. URINARY BLADDER: Hahn catheter present. LYMPH NODES: None enlarged. REPRODUCTIVE ORGANS: Recent hysterectomy with ill-defined complex fluid/blood in   the central pelvis measuring 8.9 x 6.7 cm, and in the right upper pelvis   anterior to the psoas muscle 4.5 x 4.9 cm. Extraperitoneal gas. FREE FLUID: Mild presacral edema. BONES: No destructive bone lesion.    ADDITIONAL COMMENTS: N/A.

## 2021-12-20 NOTE — PROGRESS NOTES
1930 Bedside and Verbal shift change report given to Asha Renner RN (oncoming nurse) by Jorge Garcia RN (offgoing nurse). Report included the following information SBAR, Kardex, Procedure Summary, Intake/Output, MAR, Accordion, Recent Results and Med Rec Status. 2035 Notified Joseph Matute NP from [facility] of serum and urine lab results. Okay to switch labs to q8 if Hospitalist team agrees. Will reach out to hospitalist as the ordering provider for labs. 2210 Per Hospitalist team, labs changed to q8h.

## 2021-12-20 NOTE — PROGRESS NOTES
Bedside and Verbal shift change report given to Den Joe RN  (oncoming nurse) by Tejinder Gonsales  (offgoing nurse). Report included the following information SBAR, Kardex, Intake/Output and Recent Results. 1142- page put out to Dr. Perez Mcdonald regarding Hgb    1203- MD Tripp updated on Hgb level of 6.9 and made aware nephrology had rounded and may adjustments. No new orders received at this time. 46- MD Phipps updated on Hgb level of 6.9, no orders received at this time; refer to attending.

## 2021-12-20 NOTE — PROGRESS NOTES
POD5  +flatus and BM  Vss/afeb  Ab soft, NT, dist  Ext nt    A/p post op ileus, ABLA  Cont ng one more day  ambulate

## 2021-12-21 LAB
ANION GAP SERPL CALC-SCNC: 3 MMOL/L (ref 5–15)
BUN SERPL-MCNC: 10 MG/DL (ref 6–20)
BUN/CREAT SERPL: 10 (ref 12–20)
CALCIUM SERPL-MCNC: 8.6 MG/DL (ref 8.5–10.1)
CHLORIDE SERPL-SCNC: 100 MMOL/L (ref 97–108)
CO2 SERPL-SCNC: 32 MMOL/L (ref 21–32)
COMMENT, HOLDF: NORMAL
CREAT SERPL-MCNC: 0.99 MG/DL (ref 0.55–1.02)
ERYTHROCYTE [DISTWIDTH] IN BLOOD BY AUTOMATED COUNT: 14.1 % (ref 11.5–14.5)
GLUCOSE SERPL-MCNC: 122 MG/DL (ref 65–100)
HCT VFR BLD AUTO: 21 % (ref 35–47)
HGB BLD-MCNC: 6.6 G/DL (ref 11.5–16)
MCH RBC QN AUTO: 29.7 PG (ref 26–34)
MCHC RBC AUTO-ENTMCNC: 31.4 G/DL (ref 30–36.5)
MCV RBC AUTO: 94.6 FL (ref 80–99)
NRBC # BLD: 0.08 K/UL (ref 0–0.01)
NRBC BLD-RTO: 1.3 PER 100 WBC
PLATELET # BLD AUTO: 249 K/UL (ref 150–400)
PMV BLD AUTO: 10.8 FL (ref 8.9–12.9)
POTASSIUM SERPL-SCNC: 3.9 MMOL/L (ref 3.5–5.1)
RBC # BLD AUTO: 2.22 M/UL (ref 3.8–5.2)
SAMPLES BEING HELD,HOLD: NORMAL
SODIUM SERPL-SCNC: 135 MMOL/L (ref 136–145)
WBC # BLD AUTO: 6.4 K/UL (ref 3.6–11)

## 2021-12-21 PROCEDURE — 65410000002 HC RM PRIVATE OB

## 2021-12-21 PROCEDURE — 74011000258 HC RX REV CODE- 258: Performed by: FAMILY MEDICINE

## 2021-12-21 PROCEDURE — 85027 COMPLETE CBC AUTOMATED: CPT

## 2021-12-21 PROCEDURE — 74011250636 HC RX REV CODE- 250/636: Performed by: INTERNAL MEDICINE

## 2021-12-21 PROCEDURE — C9113 INJ PANTOPRAZOLE SODIUM, VIA: HCPCS | Performed by: OBSTETRICS & GYNECOLOGY

## 2021-12-21 PROCEDURE — 74011250636 HC RX REV CODE- 250/636: Performed by: FAMILY MEDICINE

## 2021-12-21 PROCEDURE — 74011250637 HC RX REV CODE- 250/637: Performed by: SPECIALIST

## 2021-12-21 PROCEDURE — 74011250636 HC RX REV CODE- 250/636: Performed by: OBSTETRICS & GYNECOLOGY

## 2021-12-21 PROCEDURE — 80048 BASIC METABOLIC PNL TOTAL CA: CPT

## 2021-12-21 PROCEDURE — 36415 COLL VENOUS BLD VENIPUNCTURE: CPT

## 2021-12-21 RX ORDER — SODIUM CHLORIDE 0.9 % (FLUSH) 0.9 %
10 SYRINGE (ML) INJECTION EVERY 24 HOURS
Status: DISCONTINUED | OUTPATIENT
Start: 2021-12-21 | End: 2021-12-24 | Stop reason: HOSPADM

## 2021-12-21 RX ORDER — PANTOPRAZOLE SODIUM 40 MG/10ML
40 INJECTION, POWDER, LYOPHILIZED, FOR SOLUTION INTRAVENOUS EVERY 24 HOURS
Status: DISCONTINUED | OUTPATIENT
Start: 2021-12-21 | End: 2021-12-24 | Stop reason: HOSPADM

## 2021-12-21 RX ADMIN — PIPERACILLIN SODIUM AND TAZOBACTAM SODIUM 3.38 G: 3; .375 INJECTION, POWDER, LYOPHILIZED, FOR SOLUTION INTRAVENOUS at 02:39

## 2021-12-21 RX ADMIN — PANTOPRAZOLE SODIUM 40 MG: 40 INJECTION, POWDER, FOR SOLUTION INTRAVENOUS at 10:01

## 2021-12-21 RX ADMIN — LORAZEPAM 0.5 MG: 2 INJECTION INTRAMUSCULAR; INTRAVENOUS at 10:17

## 2021-12-21 RX ADMIN — POTASSIUM CHLORIDE, SODIUM CHLORIDE, CALCIUM CHLORIDE, SODIUM LACTATE, AND DEXTROSE MONOHYDRATE: 1.79; 6; .2; 3.1; 5 INJECTION, SOLUTION INTRAVENOUS at 08:20

## 2021-12-21 RX ADMIN — POTASSIUM CHLORIDE, SODIUM CHLORIDE, CALCIUM CHLORIDE, SODIUM LACTATE, AND DEXTROSE MONOHYDRATE: 1.79; 6; .2; 3.1; 5 INJECTION, SOLUTION INTRAVENOUS at 16:39

## 2021-12-21 RX ADMIN — PIPERACILLIN SODIUM AND TAZOBACTAM SODIUM 3.38 G: 3; .375 INJECTION, POWDER, LYOPHILIZED, FOR SOLUTION INTRAVENOUS at 10:01

## 2021-12-21 RX ADMIN — PIPERACILLIN SODIUM AND TAZOBACTAM SODIUM 3.38 G: 3; .375 INJECTION, POWDER, LYOPHILIZED, FOR SOLUTION INTRAVENOUS at 18:30

## 2021-12-21 RX ADMIN — Medication 10 ML: at 09:00

## 2021-12-21 NOTE — PROGRESS NOTES
Verbal shift change report given to Thania Gatica RN  (oncoming nurse) by Dafne Browne RN (offgoing nurse). Report included the following information SBAR.     4102 perfect serve Harrisonn Mariaa NP FYI pt hemoglobin is 6.6 today. Yesterday it was 6.9. Patient sister was asking me about blood transfusion but I was not sure if we were going to give her one. Provider not covering pt     1136 Note from Isidro Wallace MD shows she is aware of hgb 6.6 and states transfuse for VSS becomes unstable or pt symptomatic will update pt. Pt sister no longer at bedside. 26 Pt mother (Herber Dunn) at bedside and very upset about not getting update on pt condition. Discussed pt was rounded on and what the plan is according to Dr. Oscar Sosa note. Pt mother would like to speak with provider for an update. Pt asked if she is ok with healthcare provider giving update to pt mother Julio Marquisbourg-[id number]) or pt sister ([phone]) pt gives verbal consent. [place] Dr. Oscar Sosa cell phone called and she requested I call the office and ask Dr. Pat Merino or Dr. Marsha Sykes to update family she is out of the hospital and not her primary obgyn. 0 Office called states Dr. Marsha Sykes not in office and Dr. Pat Merino going into a surgery and to give his cell a call. 56 Dr. Pat Merino cell phone called and voicemail box full. Will retry in 27 mins-1 hour     0622 Dr. Pat Merino cell called again no answer. Asked pt mother if we can pass along to give you an update when they round tomorrow. Pt mother ok will it will pass information to the next shift.  Also written on pt white board

## 2021-12-21 NOTE — PROGRESS NOTES
Gynecology Progress Note    Patient post-op day 6 from Procedure(s):  DAVINCI ASSISTED LAPROSCOPIC SUPRACERVICAL HYSTERECTOMY,WITH  BILATERAL SALPINGO-OOPHORECTOMY, EXTENSIVE LYSIS OF ADHESIONS, UTERUS GREATER THAN 250 GRAMS . Pain controlled on current medication. Patient is passing flatus. Post op course complicated by post op bleeding, s/p 1 unit PRBRC, post operative ileus managed presently with NG tube, electrolyte disturbance. Vitals:  Blood pressure (!) 159/82, pulse 95, temperature 98.9 °F (37.2 °C), resp. rate 16, height 5' (1.524 m), weight 127 kg (280 lb), SpO2 99 %. Temp (24hrs), Av.2 °F (36.8 °C), Min:97.9 °F (36.6 °C), Max:98.9 °F (37.2 °C)        Exam:  Patient without distress. Abdomen soft,  nontender. Incision dry and clean without erythema. Lower extremities are negative for swelling, cords, or tenderness.     Lab/Data Review:  BMP:   Lab Results   Component Value Date/Time     (L) 2021 05:06 AM    K 3.9 2021 05:06 AM     2021 05:06 AM    CO2 32 2021 05:06 AM    AGAP 3 (L) 2021 05:06 AM     (H) 2021 05:06 AM    BUN 10 2021 05:06 AM    CREA 0.99 2021 05:06 AM    GFRAA >60 2021 05:06 AM    GFRNA 59 (L) 2021 05:06 AM     CMP:   Lab Results   Component Value Date/Time     (L) 2021 05:06 AM    K 3.9 2021 05:06 AM     2021 05:06 AM    CO2 32 2021 05:06 AM    AGAP 3 (L) 2021 05:06 AM     (H) 2021 05:06 AM    BUN 10 2021 05:06 AM    CREA 0.99 2021 05:06 AM    GFRAA >60 2021 05:06 AM    GFRNA 59 (L) 2021 05:06 AM    CA 8.6 2021 05:06 AM     CBC:   Lab Results   Component Value Date/Time    WBC 6.4 2021 05:06 AM    HGB 6.6 (L) 2021 05:06 AM    HCT 21.0 (L) 2021 05:06 AM     2021 05:06 AM       Assessment and Plan:  Patient  post Procedure(s):  Katelin Covert LAPROSCOPIC SUPRACERVICAL HYSTERECTOMY,WITH  BILATERAL SALPINGO-OOPHORECTOMY, EXTENSIVE LYSIS OF ADHESIONS, UTERUS GREATER THAN 250 GRAMS course. Continue routine post-op care. 1.  Repeat cbc resulted at 6.6 stable from 6.9.    2.  Urine output improved. Pt with urinary retention an unable to void after rodgers removal.  Will continue rodgers for now until NG tube is removed. 3.  Continue NG tube pt with 1000 output yesterday. Pt with 100 cc at time of rounds. Pt does report flatus. Possible clamping tomorrow if output decreases. 4.  Pt to be OOB up to chair  5. Incentive spirometry encouraged  6. Afebrile - continue IV abx for now WBC 6.4.    7.  CT with R sided hemoperitoneum (CBC stable). Transfuse a second unit if VSS become unstable or pt becomes symptomatic.

## 2021-12-21 NOTE — PROGRESS NOTES
Progress note. 12/21/21  Opal Foreman MD          Girma Burnham is a 45 y.o. female admitted on December 15 for laparoscopic supracervical hysterectomy due to uterine fibroids. Her postoperative course was complicated by ileus, requiring NG tube due to intractable vomiting. He subsequently developed a fever to 100.8,tachycardic to the 130s, nocturnal hypoxia to 88%. She also developed acute hyponatremia and hemoglobin is declining. Subjectively  -Patient is not feeling as strong as she did yesterday when she started 3-4 hours in the chair. Her mother present in the room. They inquired about blood transfusion. I told I will relay message to the primary team(I let Dr. Michaela Hamilton now)    Assessment and plan    #1. Acute hyponatremia, clinically hypovolemic. Timmy sodium 124, most recent 128. ..>127. ..>129. ..>130. ..>135  --Sodium is stabilized. Nephrology is on board. #2. Sepsis (as manifested by fever, tachycardia) lactic acid was low, 0.8.  UA is negative for UTI. Rapid Covid test is negative, she is fully vaccinated. Blood culture negative thus far  -- CT chest negative for obvious findings of pneumonia. --On Zosyn. #3.  Acute blood loss anemia: Received a unit of blood on 12/20, Hb today 6.6. Management per primary team, I have relayed patient's concern to the primary team.  #4.  Postoperative urinary retention, Hahn catheter in place, management per primary team  #5. Postoperative ileus: Being managed conservatively, n.p.o., NGT. Management per primary team      Nothing much to add from medical perspective and will sign off. Please feel free to call the hospitalist service for assistance with any acute medical issues          Physical exam    Objectively  Vital signs: VSS  HEENT: NGT to suction. Lungs: Basally diminished otherwise symmetrical air entry  Abdomen: NGT to wall suction draining dark green fluid.   Abdomen distended, diminished bowel sounds, diffusely tender without rebound or guarding. Ecchymosis on the right lower abdominal wall. Extremity: SCDs on. No edema  CNS: Alert oriented x4. Non focal      Labs reviewed, most recent ones      CBC W/O DIFF    Collection Time: 12/21/21  5:06 AM   Result Value Ref Range    WBC 6.4 3.6 - 11.0 K/uL    RBC 2.22 (L) 3.80 - 5.20 M/uL    HGB 6.6 (L) 11.5 - 16.0 g/dL    HCT 21.0 (L) 35.0 - 47.0 %    MCV 94.6 80.0 - 99.0 FL    MCH 29.7 26.0 - 34.0 PG    MCHC 31.4 30.0 - 36.5 g/dL    RDW 14.1 11.5 - 14.5 %    PLATELET 779 527 - 116 K/uL    MPV 10.8 8.9 - 12.9 FL    NRBC 1.3 (H) 0  WBC    ABSOLUTE NRBC 0.08 (H) 0.00 - 0.01 K/uL       EKG from this morning: Sinus tachycardia at a rate of 101 with other nonspecific T wave abnormalities    Rapid covid test negative,fully vaccinated. BCX,in process  UA negative for definitive signs of UTI.   CT Results  (Last 48 hours)    None

## 2021-12-21 NOTE — PROGRESS NOTES
[facility]   [address] [facility], [address] [place] [address]  Phone: [phone]   [phone]    [email or web address]     Nephrology Progress Note    Patient Name : Anish Mayer      : 1970     MRN : [id number]  Date of Admission : 12/15/2021  Date of Servive : 21    CC: Follow up for Hyponatremia     Assessment and Plan   Acute hyponatremia:  - Hypovolemic hyponatremia  - Na improving w/ isotonic fluids   - reduced rate to 100 cc/ hr   - signing off. Oliguria :  - resolved w/ IVF     Hypokalemia :  - replete PRN     S/p laparoscopic JOSE plus BSO, extensive lysis of adhesions  -Postop care per GYN     Postop SB ileus  -NGT for suction  -IVF as above     Postop blood loss anemia     Fever  -Work-up per primary team        Care Plan discussed with: Patient and RN       Interval History:  Seen and examined. Na better. No new sx     Review of Systems: A comprehensive review of systems was negative except for that written in the HPI.     Current Medications:   Current Facility-Administered Medications   Medication Dose Route Frequency    pantoprazole (PROTONIX) injection 40 mg  40 mg IntraVENous Q24H    And    sodium chloride (NS) flush 10 mL  10 mL IntraVENous Q24H    dextrose 5% - LR with KCl 20 mEq/L infusion   IntraVENous CONTINUOUS    acetaminophen (TYLENOL) suppository 1,000 mg  1,000 mg Rectal Q6H PRN    piperacillin-tazobactam (ZOSYN) 3.375 g in 0.9% sodium chloride (MBP/ADV) 100 mL MBP  3.375 g IntraVENous Q8H    phenol throat spray (CHLORASEPTIC) 1 Spray  1 Spray Oral PRN    0.9% sodium chloride infusion  125 mL/hr IntraVENous PRN    traMADoL (ULTRAM) tablet 50 mg  50 mg Oral Q6H PRN    LORazepam (ATIVAN) injection 0.5 mg  0.5 mg IntraVENous Q8H PRN    prochlorperazine (COMPAZINE) with saline injection 10 mg  10 mg IntraVENous Q6H PRN    0.9% sodium chloride infusion 250 mL  250 mL IntraVENous PRN    docusate sodium (COLACE) capsule 100 mg  100 mg Oral BID    bisacodyL (DULCOLAX) suppository 10 mg  10 mg Rectal DAILY PRN    sodium chloride (NS) flush 5-40 mL  5-40 mL IntraVENous Q8H    sodium chloride (NS) flush 5-40 mL  5-40 mL IntraVENous PRN    HYDROmorphone (DILAUDID) injection 1 mg  1 mg IntraVENous Q4H PRN    ondansetron (ZOFRAN) injection 4 mg  4 mg IntraVENous Q4H PRN    diphenhydrAMINE (BENADRYL) injection 12.5 mg  12.5 mg IntraVENous Q4H PRN    nitroglycerin (NITRODUR) 0.2 mg/hr patch 1 Patch  1 Patch TransDERmal DAILY    simethicone (MYLICON) tablet 80 mg  80 mg Oral TID PRN      No Known Allergies    Objective:  Vitals:    Vitals:    12/20/21 1437 12/20/21 1935 12/21/21 0400 12/21/21 0800   BP: (!) 146/84 (!) 165/88 138/89 (!) 159/82   Pulse: 86 89 91 95   Resp: 16 17 17 16   Temp: 97.9 °F (36.6 °C) 97.9 °F (36.6 °C) 98.1 °F (36.7 °C) 98.9 °F (37.2 °C)   SpO2: 100% 100% 99% 99%   Weight:       Height:         Intake and Output:  No intake/output data recorded. 12/19 1901 - 12/21 0700  In: 2677 [P.O.:120; I.V.:2557]  Out: 3185 [Urine:1060]    Physical Examination:  General: obese  HEENT: NGT  Neck: Supple,no mass palpable  Lungs : CTA  CVS: RRR, S1 S2 normal, No murmur   Abdomen: Soft, NT, BS +  Extremities: no Edema  Skin: No rash or lesions. MS: No joint swelling, erythema, warmth  Neurologic: non focal, AAO x 3  Psych: normal affect/     []    High complexity decision making was performed  []    Patient is at high-risk of decompensation with multiple organ involvement    Lab Data Personally Reviewed: I have reviewed all the pertinent labs, microbiology data and radiology studies during assessment.     Recent Labs     12/21/21  0506 12/20/21  2129 12/20/21  0252 12/19/21  1837 12/19/21  1604 12/19/21  0220 12/18/21 2203   * 136 135* 130* 129*   < > 124*   K 3.9 4.0 3.0* 3.2* 3.2*   < > 4.3    100 96* 93* 93*   < > 89*   CO2 32 31 34* 31 29   < > 29   * 108* 129* 130* 132*   < > 124*   BUN 10 11 13 15 14   < > 13   CREA 0.99 0.93 1.08* 1.14* 1.10*   < > 1.18*   CA 8.6 8.7 8.5 8.9 9.0   < > 9.7   PHOS  --   --   --  2.8  --   --   --    ALB  --   --   --  2.3*  --   --  2.6*   ALT  --   --   --   --   --   --  15    < > = values in this interval not displayed. Recent Labs     12/21/21  0506 12/20/21  1005 12/19/21  0602 12/18/21  2203   WBC 6.4 5.9 4.8 5.7   HGB 6.6* 6.9* 6.1* 7.2*   HCT 21.0* 21.2* 18.3* 21.1*    229 217 256     No results found for: [facility]  Lab Results   Component Value Date/Time    Culture result: NO GROWTH 3 DAYS 12/18/2021 10:03 PM     Recent Results (from the past 24 hour(s))   CBC WITH AUTOMATED DIFF    Collection Time: 12/20/21 10:05 AM   Result Value Ref Range    WBC 5.9 3.6 - 11.0 K/uL    RBC 2.31 (L) 3.80 - 5.20 M/uL    HGB 6.9 (L) 11.5 - 16.0 g/dL    HCT 21.2 (L) 35.0 - 47.0 %    MCV 91.8 80.0 - 99.0 FL    MCH 29.9 26.0 - 34.0 PG    MCHC 32.5 30.0 - 36.5 g/dL    RDW 13.7 11.5 - 14.5 %    PLATELET 240 545 - 005 K/uL    MPV 10.5 8.9 - 12.9 FL    NRBC 1.5 (H) 0  WBC    ABSOLUTE NRBC 0.09 (H) 0.00 - 0.01 K/uL    NEUTROPHILS 67 32 - 75 %    LYMPHOCYTES 12 12 - 49 %    MONOCYTES 16 (H) 5 - 13 %    EOSINOPHILS 4 0 - 7 %    BASOPHILS 0 0 - 1 %    IMMATURE GRANULOCYTES 1 (H) 0.0 - 0.5 %    ABS. NEUTROPHILS 4.0 1.8 - 8.0 K/UL    ABS. LYMPHOCYTES 0.7 (L) 0.8 - 3.5 K/UL    ABS. MONOCYTES 0.9 0.0 - 1.0 K/UL    ABS. EOSINOPHILS 0.2 0.0 - 0.4 K/UL    ABS. BASOPHILS 0.0 0.0 - 0.1 K/UL    ABS. IMM.  GRANS. 0.1 (H) 0.00 - 0.04 K/UL    DF SMEAR SCANNED      RBC COMMENTS NORMOCYTIC, NORMOCHROMIC     METABOLIC PANEL, BASIC    Collection Time: 12/20/21  9:29 PM   Result Value Ref Range    Sodium 136 136 - 145 mmol/L    Potassium 4.0 3.5 - 5.1 mmol/L    Chloride 100 97 - 108 mmol/L    CO2 31 21 - 32 mmol/L    Anion gap 5 5 - 15 mmol/L    Glucose 108 (H) 65 - 100 mg/dL    BUN 11 6 - 20 MG/DL    Creatinine 0.93 0.55 - 1.02 MG/DL    BUN/Creatinine ratio 12 12 - 20      GFR est AA >60 >60 ml/min/1.73m2 GFR est non-AA >60 >60 ml/min/1.73m2    Calcium 8.7 8.5 - 47.8 MG/DL   METABOLIC PANEL, BASIC    Collection Time: 12/21/21  5:06 AM   Result Value Ref Range    Sodium 135 (L) 136 - 145 mmol/L    Potassium 3.9 3.5 - 5.1 mmol/L    Chloride 100 97 - 108 mmol/L    CO2 32 21 - 32 mmol/L    Anion gap 3 (L) 5 - 15 mmol/L    Glucose 122 (H) 65 - 100 mg/dL    BUN 10 6 - 20 MG/DL    Creatinine 0.99 0.55 - 1.02 MG/DL    BUN/Creatinine ratio 10 (L) 12 - 20      GFR est AA >60 >60 ml/min/1.73m2    GFR est non-AA 59 (L) >60 ml/min/1.73m2    Calcium 8.6 8.5 - 10.1 MG/DL   SAMPLES BEING HELD    Collection Time: 12/21/21  5:06 AM   Result Value Ref Range    SAMPLES BEING HELD 1LAV     COMMENT        Add-on orders for these samples will be processed based on acceptable specimen integrity and analyte stability, which may vary by analyte. CBC W/O DIFF    Collection Time: 12/21/21  5:06 AM   Result Value Ref Range    WBC 6.4 3.6 - 11.0 K/uL    RBC 2.22 (L) 3.80 - 5.20 M/uL    HGB 6.6 (L) 11.5 - 16.0 g/dL    HCT 21.0 (L) 35.0 - 47.0 %    MCV 94.6 80.0 - 99.0 FL    MCH 29.7 26.0 - 34.0 PG    MCHC 31.4 30.0 - 36.5 g/dL    RDW 14.1 11.5 - 14.5 %    PLATELET 136 864 - 594 K/uL    MPV 10.8 8.9 - 12.9 FL    NRBC 1.3 (H) 0  WBC    ABSOLUTE NRBC 0.08 (H) 0.00 - 0.01 K/uL           I have reviewed the flowsheets. Chart and Pertinent Notes have been reviewed. No change in PMH ,family and social history from Consult note.       Alex Moncada [facility]

## 2021-12-21 NOTE — PROGRESS NOTES
Bedside and Verbal shift change report given to MATT Sewell RN (oncoming nurse) by Trixie Rizvi RN (offgoing nurse). Report included the following information SBAR and Kardex.

## 2021-12-22 LAB
ALBUMIN SERPL-MCNC: 2.4 G/DL (ref 3.5–5)
ALBUMIN/GLOB SERPL: 0.5 {RATIO} (ref 1.1–2.2)
ALP SERPL-CCNC: 97 U/L (ref 45–117)
ALT SERPL-CCNC: 24 U/L (ref 12–78)
ANION GAP SERPL CALC-SCNC: 3 MMOL/L (ref 5–15)
ANION GAP SERPL CALC-SCNC: 5 MMOL/L (ref 5–15)
AST SERPL-CCNC: 16 U/L (ref 15–37)
BASOPHILS # BLD: 0 K/UL (ref 0–0.1)
BASOPHILS NFR BLD: 0 % (ref 0–1)
BILIRUB SERPL-MCNC: 3.8 MG/DL (ref 0.2–1)
BUN SERPL-MCNC: 9 MG/DL (ref 6–20)
BUN SERPL-MCNC: 9 MG/DL (ref 6–20)
BUN/CREAT SERPL: 9 (ref 12–20)
BUN/CREAT SERPL: 9 (ref 12–20)
CALCIUM SERPL-MCNC: 8.9 MG/DL (ref 8.5–10.1)
CALCIUM SERPL-MCNC: 9.5 MG/DL (ref 8.5–10.1)
CHLORIDE SERPL-SCNC: 103 MMOL/L (ref 97–108)
CHLORIDE SERPL-SCNC: 104 MMOL/L (ref 97–108)
CO2 SERPL-SCNC: 27 MMOL/L (ref 21–32)
CO2 SERPL-SCNC: 29 MMOL/L (ref 21–32)
COMMENT, HOLDF: NORMAL
CREAT SERPL-MCNC: 0.97 MG/DL (ref 0.55–1.02)
CREAT SERPL-MCNC: 0.99 MG/DL (ref 0.55–1.02)
DIFFERENTIAL METHOD BLD: ABNORMAL
EOSINOPHIL # BLD: 0.3 K/UL (ref 0–0.4)
EOSINOPHIL NFR BLD: 3 % (ref 0–7)
ERYTHROCYTE [DISTWIDTH] IN BLOOD BY AUTOMATED COUNT: 14.3 % (ref 11.5–14.5)
GLOBULIN SER CALC-MCNC: 4.7 G/DL (ref 2–4)
GLUCOSE SERPL-MCNC: 116 MG/DL (ref 65–100)
GLUCOSE SERPL-MCNC: 96 MG/DL (ref 65–100)
HCT VFR BLD AUTO: 21.1 % (ref 35–47)
HCT VFR BLD AUTO: 23.9 % (ref 35–47)
HGB BLD-MCNC: 6.7 G/DL (ref 11.5–16)
HGB BLD-MCNC: 7.5 G/DL (ref 11.5–16)
IMM GRANULOCYTES # BLD AUTO: 0 K/UL
IMM GRANULOCYTES NFR BLD AUTO: 0 %
LYMPHOCYTES # BLD: 1.3 K/UL (ref 0.8–3.5)
LYMPHOCYTES NFR BLD: 13 % (ref 12–49)
MCH RBC QN AUTO: 29.4 PG (ref 26–34)
MCHC RBC AUTO-ENTMCNC: 31.4 G/DL (ref 30–36.5)
MCV RBC AUTO: 93.7 FL (ref 80–99)
MONOCYTES # BLD: 1.4 K/UL (ref 0–1)
MONOCYTES NFR BLD: 14 % (ref 5–13)
NEUTS SEG # BLD: 6.9 K/UL (ref 1.8–8)
NEUTS SEG NFR BLD: 70 % (ref 32–75)
NRBC # BLD: 0.03 K/UL (ref 0–0.01)
NRBC BLD-RTO: 0.3 PER 100 WBC
PLATELET # BLD AUTO: 311 K/UL (ref 150–400)
PMV BLD AUTO: 10.5 FL (ref 8.9–12.9)
POTASSIUM SERPL-SCNC: 4.1 MMOL/L (ref 3.5–5.1)
POTASSIUM SERPL-SCNC: 4.1 MMOL/L (ref 3.5–5.1)
PROT SERPL-MCNC: 7.1 G/DL (ref 6.4–8.2)
RBC # BLD AUTO: 2.55 M/UL (ref 3.8–5.2)
RBC MORPH BLD: ABNORMAL
SAMPLES BEING HELD,HOLD: NORMAL
SODIUM SERPL-SCNC: 135 MMOL/L (ref 136–145)
SODIUM SERPL-SCNC: 136 MMOL/L (ref 136–145)
WBC # BLD AUTO: 9.9 K/UL (ref 3.6–11)

## 2021-12-22 PROCEDURE — 74011250636 HC RX REV CODE- 250/636: Performed by: INTERNAL MEDICINE

## 2021-12-22 PROCEDURE — 74011250636 HC RX REV CODE- 250/636: Performed by: FAMILY MEDICINE

## 2021-12-22 PROCEDURE — 36415 COLL VENOUS BLD VENIPUNCTURE: CPT

## 2021-12-22 PROCEDURE — C9113 INJ PANTOPRAZOLE SODIUM, VIA: HCPCS | Performed by: OBSTETRICS & GYNECOLOGY

## 2021-12-22 PROCEDURE — 74011000258 HC RX REV CODE- 258: Performed by: FAMILY MEDICINE

## 2021-12-22 PROCEDURE — 74011250636 HC RX REV CODE- 250/636: Performed by: OBSTETRICS & GYNECOLOGY

## 2021-12-22 PROCEDURE — 85018 HEMOGLOBIN: CPT

## 2021-12-22 PROCEDURE — 80053 COMPREHEN METABOLIC PANEL: CPT

## 2021-12-22 PROCEDURE — 65410000002 HC RM PRIVATE OB

## 2021-12-22 PROCEDURE — 85025 COMPLETE CBC W/AUTO DIFF WBC: CPT

## 2021-12-22 RX ADMIN — POTASSIUM CHLORIDE, SODIUM CHLORIDE, CALCIUM CHLORIDE, SODIUM LACTATE, AND DEXTROSE MONOHYDRATE: 1.79; 6; .2; 3.1; 5 INJECTION, SOLUTION INTRAVENOUS at 16:35

## 2021-12-22 RX ADMIN — Medication 10 ML: at 16:35

## 2021-12-22 RX ADMIN — PIPERACILLIN SODIUM AND TAZOBACTAM SODIUM 3.38 G: 3; .375 INJECTION, POWDER, LYOPHILIZED, FOR SOLUTION INTRAVENOUS at 10:49

## 2021-12-22 RX ADMIN — Medication 10 ML: at 19:12

## 2021-12-22 RX ADMIN — PANTOPRAZOLE SODIUM 40 MG: 40 INJECTION, POWDER, FOR SOLUTION INTRAVENOUS at 10:01

## 2021-12-22 RX ADMIN — Medication 10 ML: at 10:01

## 2021-12-22 RX ADMIN — PIPERACILLIN SODIUM AND TAZOBACTAM SODIUM 3.38 G: 3; .375 INJECTION, POWDER, LYOPHILIZED, FOR SOLUTION INTRAVENOUS at 02:53

## 2021-12-22 RX ADMIN — POTASSIUM CHLORIDE, SODIUM CHLORIDE, CALCIUM CHLORIDE, SODIUM LACTATE, AND DEXTROSE MONOHYDRATE: 1.79; 6; .2; 3.1; 5 INJECTION, SOLUTION INTRAVENOUS at 02:53

## 2021-12-22 RX ADMIN — PIPERACILLIN SODIUM AND TAZOBACTAM SODIUM 3.38 G: 3; .375 INJECTION, POWDER, LYOPHILIZED, FOR SOLUTION INTRAVENOUS at 19:11

## 2021-12-22 RX ADMIN — LORAZEPAM 0.5 MG: 2 INJECTION INTRAMUSCULAR; INTRAVENOUS at 00:18

## 2021-12-22 NOTE — PROGRESS NOTES
Bedside and Verbal shift change report given to MATT Walsh RN (oncoming nurse) by Sophia Cuevas RN (offgoing nurse). Report included the following information SBAR and Kardex.

## 2021-12-22 NOTE — PROGRESS NOTES
Transition of Care Plan   RUR: 9%    Disposition: The disposition plan is pending medical progression   F/U with PCP/Specialist   Mother states [facility] PT/OT needed; choice given to any PAR HH agency     Transport: Mother    Reason for Admission:  Fibroid uterus                      RUR Score:        9%             Plan for utilizing home health:     TBD    PCP: First and Last name:  Brian Reid NP     Name of Practice:    Are you a current patient: Yes/No:    Approximate date of last visit:    Can you participate in a virtual visit with your PCP:                     Current Advanced Directive/Advance Care Plan: No Order      Healthcare Decision Maker:                     Transition of Care Plan:                      CRM spoke with patient's mother, introduced self, explained role, verified demographics, and offered assistance. The patient lives alone in a home with about 5 steps to access. The patient is independent in her ADL's/IADL's and does not currently have any DME. The patient uses [facility] [address] in [facility]. Patient's mother states the patient is in need of [facility] PT/OT and CM communicated orders will need to be placed if that is the physicians recommendations; she stated physician is in agreeance and she will speak with them about the orders. Choice was provided and they are good with any PAR provider that services the patient's area. Care Management Interventions  PCP Verified by CM: Yes  Palliative Care Criteria Met (RRAT>21 & CHF Dx)?: No  Mode of Transport at Discharge:  Other (see comment) (mother)  Transition of Care Consult (CM Consult): Discharge Planning  MyChart Signup: No  Discharge Durable Medical Equipment: No  Health Maintenance Reviewed: Yes  Physical Therapy Consult: No  Occupational Therapy Consult: No  Speech Therapy Consult: No  Support Systems: Parent(s)  Confirm Follow Up Transport: Family  [employer] Information Provided?: No  Discharge Location  Discharge Placement: Home with home health    Timoteo Meng, [address]

## 2021-12-22 NOTE — PROGRESS NOTES
Comprehensive Nutrition Assessment    Type and Reason for Visit: Initial    Nutrition Recommendations/Plan:      If unable to successfully advance diet in the next 24 hours, begin PPN. Start with AA 5% D10 @ 60 ml/hr and increase to 83 ml/hr. Add 20% 500 ml Lipids x3/week    Add Vit C and zinc to TPN         Nutrition Assessment:    51 year admitted on 12/15 for laparoscopic supracervical hysterectomy due to uterine fibroids. Course complicated by post-op ileus, NGT placed d/t vomiting. Pt also with fever, tachycardia, UTI negative, COVID negative, CT of chest negative for pneumonia. Currently clamping NGT. Hope to advance to clears. If unable to successfully advance diet in the next 24 hours, begin PPN. Start with AA 5% D10 @ 60 ml/hr and increase to 83 ml/hr. Add 20% 500 ml Lipids x3/week  Add Vit C and zinc to TPN . Malnutrition Assessment:  Malnutrition Status: At risk for malnutrition      Nutritionally Significant Medications: zosyn, IVF @ 125 ml/hr     Estimated Daily Nutrient Needs:  Energy (kcal): 3587-9346 kcal/day ( MSJ 1 -1.2);  Weight Used for Energy Requirements: Current  Protein (g): 127 g /day; Weight Used for Protein Requirements: Current  Fluid (ml/day): 2000 ml; Method Used for Fluid Requirements: 1 ml/kcal    Nutrition Related Findings:       BM: x3  Edema: none  NGT output: 650 ml  Wounds:  None       Current Nutrition Therapies:   Diet: NPO    Anthropometric Measures:  · Height:  5' (152.4 cm)  · Current Body Wt:  127 kg (279 lb 15.8 oz)   · Ideal Body Wt:  100:  280 %   · Adjusted Body Weight:   ; Weight Adjustment for: No adjustment   · Adjusted BMI:       · BMI Categories:  Obese class 3 (BMI 40.0 or greater)     Wt Readings from Last 10 Encounters:   12/15/21 127 kg (280 lb)   08/01/19 96.2 kg (212 lb)   07/02/19 96.6 kg (213 lb)   05/30/19 98.4 kg (217 lb)   10/23/18 106.6 kg (235 lb)   05/24/18 118.3 kg (260 lb 12.8 oz)       Nutrition Diagnosis:   · Inadequate oral intake related to altered GI structure as evidenced by intake 0-25%      Nutrition Interventions:   Food and/or Nutrient Delivery: Continue NPO,Modify current diet  Nutrition Education and Counseling: No recommendations at this time  Coordination of Nutrition Care: Continue to monitor while inpatient    Goals:  Successfully advance diet in the next 2-3 days. Nutrition Monitoring and Evaluation:   Behavioral-Environmental Outcomes: None identified  Food/Nutrient Intake Outcomes: Food and nutrient intake,IVF intake  Physical Signs/Symptoms Outcomes: Biochemical data,Weight,GI status    Discharge Planning:     Too soon to determine     Mauricio Godfrey RD

## 2021-12-22 NOTE — PROGRESS NOTES
POD7  Min pain  Vss/afeb, uop adequate  Ab soft, nt, less dist  Ext nt    Hb improving    A/p doing better  Clamp ng, remove later if nella clears  ambulate

## 2021-12-23 LAB
ANION GAP SERPL CALC-SCNC: 6 MMOL/L (ref 5–15)
BACTERIA SPEC CULT: NORMAL
BUN SERPL-MCNC: 9 MG/DL (ref 6–20)
BUN/CREAT SERPL: 9 (ref 12–20)
CALCIUM SERPL-MCNC: 8.8 MG/DL (ref 8.5–10.1)
CHLORIDE SERPL-SCNC: 104 MMOL/L (ref 97–108)
CO2 SERPL-SCNC: 27 MMOL/L (ref 21–32)
CREAT SERPL-MCNC: 0.99 MG/DL (ref 0.55–1.02)
GLUCOSE SERPL-MCNC: 106 MG/DL (ref 65–100)
HCT VFR BLD AUTO: 23.2 % (ref 35–47)
HGB BLD-MCNC: 7.3 G/DL (ref 11.5–16)
POTASSIUM SERPL-SCNC: 4.3 MMOL/L (ref 3.5–5.1)
SERVICE CMNT-IMP: NORMAL
SODIUM SERPL-SCNC: 137 MMOL/L (ref 136–145)

## 2021-12-23 PROCEDURE — C9113 INJ PANTOPRAZOLE SODIUM, VIA: HCPCS | Performed by: OBSTETRICS & GYNECOLOGY

## 2021-12-23 PROCEDURE — 74011250636 HC RX REV CODE- 250/636: Performed by: INTERNAL MEDICINE

## 2021-12-23 PROCEDURE — 74011000258 HC RX REV CODE- 258: Performed by: FAMILY MEDICINE

## 2021-12-23 PROCEDURE — 74011250636 HC RX REV CODE- 250/636: Performed by: OBSTETRICS & GYNECOLOGY

## 2021-12-23 PROCEDURE — 65410000002 HC RM PRIVATE OB

## 2021-12-23 PROCEDURE — 74011250636 HC RX REV CODE- 250/636: Performed by: FAMILY MEDICINE

## 2021-12-23 PROCEDURE — 80048 BASIC METABOLIC PNL TOTAL CA: CPT

## 2021-12-23 PROCEDURE — 85018 HEMOGLOBIN: CPT

## 2021-12-23 PROCEDURE — 74011250637 HC RX REV CODE- 250/637: Performed by: OBSTETRICS & GYNECOLOGY

## 2021-12-23 PROCEDURE — 36415 COLL VENOUS BLD VENIPUNCTURE: CPT

## 2021-12-23 RX ADMIN — PIPERACILLIN SODIUM AND TAZOBACTAM SODIUM 3.38 G: 3; .375 INJECTION, POWDER, LYOPHILIZED, FOR SOLUTION INTRAVENOUS at 02:48

## 2021-12-23 RX ADMIN — Medication 10 ML: at 21:18

## 2021-12-23 RX ADMIN — TRAMADOL HYDROCHLORIDE 50 MG: 50 TABLET, COATED ORAL at 15:31

## 2021-12-23 RX ADMIN — TRAMADOL HYDROCHLORIDE 50 MG: 50 TABLET, COATED ORAL at 21:18

## 2021-12-23 RX ADMIN — PIPERACILLIN SODIUM AND TAZOBACTAM SODIUM 3.38 G: 3; .375 INJECTION, POWDER, LYOPHILIZED, FOR SOLUTION INTRAVENOUS at 10:36

## 2021-12-23 RX ADMIN — Medication 10 ML: at 09:47

## 2021-12-23 RX ADMIN — PIPERACILLIN SODIUM AND TAZOBACTAM SODIUM 3.38 G: 3; .375 INJECTION, POWDER, LYOPHILIZED, FOR SOLUTION INTRAVENOUS at 19:32

## 2021-12-23 RX ADMIN — POTASSIUM CHLORIDE, SODIUM CHLORIDE, CALCIUM CHLORIDE, SODIUM LACTATE, AND DEXTROSE MONOHYDRATE: 1.79; 6; .2; 3.1; 5 INJECTION, SOLUTION INTRAVENOUS at 22:35

## 2021-12-23 RX ADMIN — Medication 10 ML: at 14:00

## 2021-12-23 RX ADMIN — PANTOPRAZOLE SODIUM 40 MG: 40 INJECTION, POWDER, FOR SOLUTION INTRAVENOUS at 09:45

## 2021-12-23 RX ADMIN — POTASSIUM CHLORIDE, SODIUM CHLORIDE, CALCIUM CHLORIDE, SODIUM LACTATE, AND DEXTROSE MONOHYDRATE: 1.79; 6; .2; 3.1; 5 INJECTION, SOLUTION INTRAVENOUS at 10:37

## 2021-12-23 NOTE — PROGRESS NOTES
Bedside and Verbal shift change report given to CELI Mohamud (oncoming nurse) by Dorinda Hooper (offgoing nurse). Report included the following information SBAR, Kardex, Procedure Summary and Recent Results.

## 2021-12-23 NOTE — PROGRESS NOTES
Gynecology Progress Note    Patient with improved course post-op day 8 from Procedure(s):  DAVINCI ASSISTED LAPROSCOPIC SUPRACERVICAL HYSTERECTOMY,WITH  BILATERAL SALPINGO-OOPHORECTOMY, EXTENSIVE LYSIS OF ADHESIONS, UTERUS GREATER THAN 250 GRAMS without significant complaints. Pain controlled on current medication. Voiding without difficulty. Patient is passing flatus. Vitals:  Blood pressure 123/75, pulse 88, temperature 98.5 °F (36.9 °C), resp. rate 16, height 5' (1.524 m), weight 127 kg (280 lb), SpO2 99 %. Temp (24hrs), Av.6 °F (37 °C), Min:98 °F (36.7 °C), Max:99.3 °F (37.4 °C)        Exam:  Patient without distress. Abdomen soft,  nontender. Incision dry and clean without erythema. Lower extremities are negative for swelling, cords, or tenderness.     Lab/Data Review:  BMP:   Lab Results   Component Value Date/Time     2021 02:02 AM    K 4.3 2021 02:02 AM     2021 02:02 AM    CO2 27 2021 02:02 AM    AGAP 6 2021 02:02 AM     (H) 2021 02:02 AM    BUN 9 2021 02:02 AM    CREA 0.99 2021 02:02 AM    GFRAA >60 2021 02:02 AM    GFRNA 59 (L) 2021 02:02 AM     CMP:   Lab Results   Component Value Date/Time     2021 02:02 AM    K 4.3 2021 02:02 AM     2021 02:02 AM    CO2 27 2021 02:02 AM    AGAP 6 2021 02:02 AM     (H) 2021 02:02 AM    BUN 9 2021 02:02 AM    CREA 0.99 2021 02:02 AM    GFRAA >60 2021 02:02 AM    GFRNA 59 (L) 2021 02:02 AM    CA 8.8 2021 02:02 AM    ALB 2.4 (L) 2021 03:02 PM    TP 7.1 2021 03:02 PM    GLOB 4.7 (H) 2021 03:02 PM    AGRAT 0.5 (L) 2021 03:02 PM    ALT 24 2021 03:02 PM     CBC:   Lab Results   Component Value Date/Time    WBC 9.9 2021 03:02 PM    HGB 7.3 (L) 2021 02:02 AM    HCT 23.2 (L) 2021 02:02 AM     2021 03:02 PM Assessment and Plan:  Patient post Procedure(s):  DAVINCI ASSISTED LAPROSCOPIC SUPRACERVICAL HYSTERECTOMY,WITH  BILATERAL SALPINGO-OOPHORECTOMY, EXTENSIVE LYSIS OF ADHESIONS, UTERUS GREATER THAN 250 GRAMS course. Continue routine post-op care. 1.  NG tube out  2. Pt denies nausea, pt is having bowel movements (loose)  3. Advance diet to soft mechanical  4. Will remove rodgers and attempt another voiding trial; pt with hx of urinary retention. If unable to void likely possible need to d/c home with a rodgers.   5.  Ambulate

## 2021-12-23 NOTE — PROGRESS NOTES
Problem: Falls - Risk of  Goal: *Absence of Falls  Description: Document Earma Gilberto Fall Risk and appropriate interventions in the flowsheet. Outcome: Progressing Towards Goal  Note: Fall Risk Interventions:  Mobility Interventions: Patient to call before getting OOB         Medication Interventions: Patient to call before getting OOB    Elimination Interventions: Call light in reach    History of Falls Interventions: Door open when patient unattended         Problem: Patient Education: Go to Patient Education Activity  Goal: Patient/Family Education  Outcome: Progressing Towards Goal     Problem: Pressure Injury - Risk of  Goal: *Prevention of pressure injury  Description: Document Scar Scale and appropriate interventions in the flowsheet. Outcome: Progressing Towards Goal  Note: Pressure Injury Interventions:             Activity Interventions: Increase time out of bed    Mobility Interventions: Pressure redistribution bed/mattress (bed type)    Nutrition Interventions: Document food/fluid/supplement intake                     Problem: Patient Education: Go to Patient Education Activity  Goal: Patient/Family Education  Outcome: Progressing Towards Goal     Problem: Vaginal Hysterectomy: Post-Op Day 1/Day of Discharge  Goal: Activity/Safety  Outcome: Progressing Towards Goal  Goal: Diagnostic Test/Procedures  Outcome: Progressing Towards Goal     Problem: Vaginal Hysterectomy: Discharge Outcomes  Goal: *Demonstrates progressive activity  Outcome: Progressing Towards Goal  Goal: *Hemodynamically stable  Outcome: Progressing Towards Goal

## 2021-12-24 VITALS
DIASTOLIC BLOOD PRESSURE: 88 MMHG | TEMPERATURE: 98.7 F | RESPIRATION RATE: 18 BRPM | HEART RATE: 95 BPM | SYSTOLIC BLOOD PRESSURE: 138 MMHG | OXYGEN SATURATION: 98 % | HEIGHT: 60 IN | BODY MASS INDEX: 54.97 KG/M2 | WEIGHT: 280 LBS

## 2021-12-24 PROCEDURE — 90686 IIV4 VACC NO PRSV 0.5 ML IM: CPT | Performed by: OBSTETRICS & GYNECOLOGY

## 2021-12-24 PROCEDURE — 74011250636 HC RX REV CODE- 250/636: Performed by: OBSTETRICS & GYNECOLOGY

## 2021-12-24 PROCEDURE — 74011000258 HC RX REV CODE- 258: Performed by: FAMILY MEDICINE

## 2021-12-24 PROCEDURE — 74011250636 HC RX REV CODE- 250/636: Performed by: FAMILY MEDICINE

## 2021-12-24 PROCEDURE — 90471 IMMUNIZATION ADMIN: CPT

## 2021-12-24 RX ORDER — TRAMADOL HYDROCHLORIDE 50 MG/1
50 TABLET ORAL
Qty: 18 TABLET | Refills: 0 | Status: SHIPPED | OUTPATIENT
Start: 2021-12-24 | End: 2021-12-27

## 2021-12-24 RX ORDER — CIPROFLOXACIN 500 MG/1
500 TABLET ORAL 2 TIMES DAILY
Qty: 14 TABLET | Refills: 0 | Status: ON HOLD | OUTPATIENT
Start: 2021-12-24 | End: 2022-06-06

## 2021-12-24 RX ADMIN — INFLUENZA VIRUS VACCINE 0.5 ML: 15; 15; 15; 15 SUSPENSION INTRAMUSCULAR at 09:55

## 2021-12-24 RX ADMIN — PIPERACILLIN SODIUM AND TAZOBACTAM SODIUM 3.38 G: 3; .375 INJECTION, POWDER, LYOPHILIZED, FOR SOLUTION INTRAVENOUS at 03:19

## 2021-12-24 NOTE — DISCHARGE SUMMARY
Discharge Summary     Name: Regina Villalobos MRN: [id number]  SSN: [id number]    YOB: 1970  Age: 45 y.o. Sex: female      Allergies: Patient has no known allergies. Admit Date: 12/15/2021    Discharge Date: 12/24/2021      Admitting Physician: Cheri Peter MD     * Admission Diagnoses: Menorrhagia    * Discharge Diagnoses:   Hospital Problems as of 12/24/2021 Date Reviewed: 10/23/2018          Codes Class Noted - Resolved POA    Fibroid uterus ICD-10-CM: D25.9  ICD-9-CM: 218.9  12/15/2021 - Present Unknown               * Procedures: Robotic Assisted Total Laparoscopic Hysterectomy    * Discharge Condition: [address] Course: Normal hospital course for this procedure. Significant Diagnostic Studies: No results found for this or any previous visit (from the past 24 hour(s)). * Disposition: Home    Discharge Medications:   Current Discharge Medication List      START taking these medications    Details   traMADoL (Ultram) 50 mg tablet Take 1 Tablet by mouth every four (4) hours as needed for Pain for up to 3 days. Max Daily Amount: 300 mg. Qty: 18 Tablet, Refills: 0  Start date: 12/24/2021, End date: 12/27/2021    Associated Diagnoses: S/P hysterectomy      ciprofloxacin HCl (CIPRO) 500 mg tablet Take 1 Tablet by mouth two (2) times a day. Qty: 14 Tablet, Refills: 0  Start date: 12/24/2021         CONTINUE these medications which have NOT CHANGED    Details   naproxen (NAPROSYN) 500 mg tablet Take 500 mg by mouth two (2) times daily (with meals). ergocalciferol (Vitamin D2) 1,250 mcg (50,000 unit) capsule Take 50,000 Units by mouth every seven (7) days. Saturday      omega 3-DHA-EPA-fish oil (Fish OiL) 1,000 mg (120 mg-180 mg) capsule Take 1 Capsule by mouth daily. multivitamin (ONE A DAY) tablet Take 1 Tablet by mouth daily. elderberry fruit (ELDERBERRY PO) Take 1 Tablet by mouth daily.       B-complex with vitamin C (VITAMIN B COMPLEX-C PO) Take 1 Tablet by mouth Maksim cAosta 1956     Office/Outpatient Visit    Visit Date: Tue, Mar 13, 2018 08:32 am    Provider: Yolanda Marie N.P. (Assistant: Vilma Marie MA)    Location: [facility]        Electronically signed by Yolanda Marie N.P. on  03/13/2018 11:20:33 AM                             SUBJECTIVE:        CC:     Mr. Acosta is a 61 year old Black or  male.  Patient is here for Medicare Wellness Visit.          HPI:         Mr. Acosta is here for a Medicare wellness visit.  Individual and family medical history was reviewed and updated A list of current providers and suppliers reviewed and updated A current height, weight, BMI, blood pressure, and pulse were recorded in the vitals section of the note and have been reviewed A review of possible cognitive impairment was performed and the following was noted: he is not having trouble driving;  memory changes are noted A review of functional ability and level of safety was performed and was negative He denies having trouble hearing the TV/radio when others do not, having to strain to hear or understand conversations and wearing hearing aid(s).  Concerning home safety, he reports that at home he DOES have throw rugs, grab bars in the bath, handrails on stairs and functioning smoke alarms, but not poor lighting or a slippery bath or shower.      Immunization Status: ** Has not received hepatitis B immunization; Age>60, no shingles vaccination;     Physical Activity: Exercises at least 3 times per week; Has not had any falls or only one fall without injury in the past year.  Advance Care Directive updated today in [facility] Tobacco: He has a past history of cigarette smoking; quit date:  2011.    Preventative Health updated today.          He denies dissatisfaction with his life, getting bored often, feeling helpless, preferring to stay at home rather than going out and doing new things and feeling worthless the way he is now.          Type  2 diabetes details; current meds include an oral hypoglycemic ( Glucophage ( 1000mg bid ) and januvia 100 mg daily ).  He reports home blood glucose readings have been fairly good, with average fasting glucoses running in the 120-150 mg/dL range.  Most recent lab results include Hemoglobin A1c:  6.7 (%) (2017), LDL:  42 (mg/dL) (2017), HDL:  51 (mg/dL) (2017), Triglycerides:  62 (mg/dL) (2017), Microalbuminuria:  6.6 (mg/g creat) (2017), Dilated Eye Exam by date:  05/10/2017 (2017), Foot Exam (Annual):  2017 (2017).      ROS:     CONSTITUTIONAL:  Negative for chills and fever.      EYES:  Negative for blurred vision.      E/N/T:  Negative for nasal congestion and sore throat.      CARDIOVASCULAR:  Negative for chest pain and palpitations.      RESPIRATORY:  Negative for recent cough and dyspnea.      GASTROINTESTINAL:  Negative for abdominal pain, nausea and vomiting.      MUSCULOSKELETAL:  Positive for arthralgias and back pain ( chronic ).   Negative for myalgias.      INTEGUMENTARY:  Positive for rash (psoriasis, has not seen derm yet, hydroxyzine helps).   Negative for atypical mole(s).      NEUROLOGICAL:  Positive for memory loss (for 2-3 years).   Negative for paresthesias or weakness.      PSYCHIATRIC:  Negative for anxiety and depression.          PMH/FMH/SH:     Last Reviewed on 3/13/2018 09:04 AM by Yolanda Marie    Past Medical History:         Hyperlipidemia    Hypertension     Sleep Apnea: (+) sleep study; uses CPAP;     Chronic Pain: affecting the low back;     Type 2 Diabetes: dx'd in ;     Glaucoma: dx'd in ;         PAST MEDICAL HISTORY                 ADVANCED DIRECTIVES: None         PREVENTIVE HEALTH MAINTENANCE             COLORECTAL CANCER SCREENING:; 2014         Surgical History:         Procedures:    Colonoscopy ( 3-/Dany (has had polyps)due follow up in 2019 )         Family History:     Father:  at age 83;   daily.      B.infantis-B.ani-B.long-B.bifi (Probiotic 4X) 10-15 mg TbEC Take 1 Tablet by mouth daily. STOP taking these medications       oxyCODONE-acetaminophen (Percocet) 5-325 mg per tablet Comments:   Reason for Stopping:                * Follow-up Care/Patient Instructions: Activity: No sex, douching, or tampons for 6 weeks or as directed by your physician. No heavy lifting for 6 weeks. No driving while taking pain medication.   Diet: Resume pre-hospital diet  Wound Care: Keep wound clean and dry    Follow-up Information     Follow up With Specialties Details Why Contact Info    Anum Bullock NP Nurse Practitioner   [address] [place] [address]  [phone]      [facility]  Schedule an appointment as soon as possible for a visit in 1 week or , As needed, If symptoms worsen [phone] Dementia;  Type 2 Diabetes     Mother:  at age 51; Cause of death was CVA     Brother(s): 3 brother(s) total; 2 ;  Myocardial Infarction (  71 );  cancer of pharynx     Sister(s): 2 sister(s) total; 2 ;   at birth         Social History:     Occupation: Disabled (due to low back pain)     Marital Status:      Children: 2 children         Tobacco/Alcohol/Supplements:     Last Reviewed on 3/13/2018 08:36 AM by Vilma Marie    Tobacco: He has never smoked.  Non-drinker             Immunizations:     Fluvirin (4 + years dose) 2016     Fluvirin (4 + years dose) 2017         Allergies:     Last Reviewed on 3/13/2018 08:36 AM by Vilma Marie      No Known Drug Allergies.         Current Medications:     Last Reviewed on 3/13/2018 08:36 AM by Vilma Marie    Trazodone HCl 100mg Tablet 1 tab HS     Amlodipine  10mg Tablet 1 tab daily     Clonidine HCl 0.2mg Tablets Take 1 tablet(s) by mouth bid     Januvia 100mg Tablet Take 1 tablet(s) by mouth daily     Lisinopril/Hydrochlorothiazide 20mg/12.5mg Tablet 1 bid     Metformin HCl 1,000mg Tablet One PO BID.     Potassium Chloride 10mEq Tablets, Extended Release Take 1 tablet(s) by mouth daily     Glucose Reagent Blood Test Strips  Reagent Strips Freestyle Lite check BS QD     FreeStyle Lancets  Lancet Check BS 1time daily DX :E11.9     Hydroxyzine HCl 25mg Tablet Take 1 tablet(s) by mouth tid PRN itching     Combigan 0.2%/0.5% Ophthalmic Solution 1 drop each eye BID     CPAP machine     Atorvastatin Calcium 40mg Tablet 1/2 tab daily         OBJECTIVE:        Vitals:         Current: 3/13/2018 8:36:18 AM    Ht:  5 ft, 11 in;  Wt: 264.7 lbs;  BMI: 36.9    T: 94.2 F (tympanic);  BP: 114/78 mm Hg (left arm, sitting);  P: 76 bpm (left arm (BP Cuff), sitting);  sCr: 0.94 mg/dL;  GFR: 94.08    VA: 20/30 OD, 20/30 OS (near, with correction)        Exams:     PHYSICAL EXAM:     GENERAL: Vitals recorded well developed, well  nourished;  well groomed;  no apparent distress;     EYES: lids and lacrimal system are normal in appearance; extraocular movements intact; conjunctiva and cornea are normal; PERRLA;     E/N/T:  normal EACs, TMs, nasal/oral mucosa, teeth, gingiva, and oropharynx;     NECK:  supple, full ROM; no thyromegaly; no carotid bruits;     RESPIRATORY: normal respiratory rate and pattern with no distress; normal breath sounds with no rales, rhonchi, wheezes or rubs;     CARDIOVASCULAR: normal rate; rhythm is regular;  no systolic murmur; 1+ pedal edema;     GASTROINTESTINAL: nontender, nondistended; no hepatosplenomegaly or masses; no bruits;     LYMPHATIC: no enlargement of cervical or facial nodes;     SKIN:  no significant rashes or lesions; no suspicious moles;     MUSCULOSKELETAL:  Normal range of motion, strength and tone;     NEUROLOGIC: GROSSLY INTACT     PSYCHIATRIC:  appropriate affect and demeanor; normal speech pattern; grossly normal memory;         ASSESSMENT           V70.0   Z00.01  Health checkup              DDx:     V79.0   Z13.89  Screening for depression              DDx:     250.00   E11.9  Type 2 diabetes              DDx:     696.1   L40.9  Psoriasis              DDx:     272.4   E78.2  Hyperlipidemia              DDx:     780.93   R41.89  Memory loss NOS              DDx:     401.1   I10  Hypertension              DDx:         ORDERS:         Meds Prescribed:       Refill of: Amlodipine  10mg Tablet 1 tab daily  #90 (Ninety) tablet(s) Refills: 1       Refill of: Clonidine HCl 0.2mg Tablets Take 1 tablet(s) by mouth bid  #180 (One [place] and [place]y) tablet(s) Refills: 1       Refill of: Lisinopril/Hydrochlorothiazide 20mg/12.5mg Tablet 1 bid  #180 (One [place]y) tablet(s) Refills: 1       Refill of: Atorvastatin Calcium 20mg Tablet 1 tab daily  #90 (Ninety) tablet(s) Refills: 1       Refill of: Januvia (Sitagliptin Phosphate) 100mg Tablet Take 1 tablet(s) by mouth daily  #90 (Ninety)  tablet(s) Refills: 1       Refill of: Metformin HCl 1,000mg Tablet One PO BID.  #180 (One [place] and [place]y) tablet(s) Refills: 1         Lab Orders:       *  PRSAS Medicare screening PSA  (Send-Out)         04739  LPDP [facility] Lipid Panel  (Send-Out)         43642  BDCBC [facility] CBC with 3 part diff  (Send-Out)         16990  TSH - [facility] TSH  (Send-Out)           Procedures Ordered:       REFER  Referral to Specialist or Other Facility  (Send-Out)         REFER  Referral to Specialist or Other Facility  (Send-Out)           Annual wellness visit, includes a PPPS, subsequent visit  (In-House)           Other Orders:         Depression screen negative  (In-House)                   PLAN:          Health checkup thinks he had some vaccines at [facility], will check with them and his previous HCP     LABORATORY:  Labs ordered to be performed today include PSA.      COUNSELING was provided today regarding the following topics: healthy eating habits, low salt diet, regular exercise, use of seat belts, Advanced Directives information given, and ADVISED TO SEE AN EYE DOCTOR AND A DENTIST REGULARLY.            Orders:       *  PRSAS Medicare screening PSA  (Send-Out)           Annual wellness visit, includes a PPPS, subsequent visit  (In-House)            Screening for depression     MIPS Negative Depression Screen           Orders:         Depression screen negative  (In-House)            Type 2 diabetes A1C good in , follow up labs due in 5-2017, send for last eye exam           Prescriptions:       Refill of: Januvia (Sitagliptin Phosphate) 100mg Tablet Take 1 tablet(s) by mouth daily  #90 (Ninety) tablet(s) Refills: 1       Refill of: Metformin HCl 1,000mg Tablet One PO BID.  #180 (One [place] and [place]y) tablet(s) Refills: 1          Psoriasis he needs a referral to derm at [facility], he had transportation issues /he has a car, just got it running again but does not drive to [place]          REFERRALS:  Referral initiated to a dermatologist ( at [facility]; for evaluation of psoriasis ).            Orders:       REFER  Referral to Specialist or Other Facility  (Send-Out)             Patient Education Handouts:       Psoriasis           Hyperlipidemia recheck lipids (took him off lovaza, takes Lipitor )     LABORATORY:  Labs ordered to be performed today include lipid panel.            Prescriptions:       Refill of: Atorvastatin Calcium 20mg Tablet 1 tab daily  #90 (Ninety) tablet(s) Refills: 1           Orders:       53541  [facility] Lipid Panel  (Send-Out)            Memory loss NOS send to neurology, I don't think he should drive out of [place]     LABORATORY:  Labs ordered to be performed today include CBC and TSH.      REFERRALS:  Referral initiated to a neurologist ( Dr. Sushila Barclay; for evaluation of memory loss ).            Orders:       24887  [facility] CBC with 3 part diff  (Send-Out)         83064  TSH [facility] TSH  (Send-Out)         REFER  Referral to Specialist or Other Facility  (Send-Out)            Hypertension           Prescriptions:       Refill of: Amlodipine  10mg Tablet 1 tab daily  #90 (Ninety) tablet(s) Refills: 1       Refill of: Clonidine HCl 0.2mg Tablets Take 1 tablet(s) by mouth bid  #180 (One [place] and Eighty) tablet(s) Refills: 1       Refill of: Lisinopril/Hydrochlorothiazide 20mg/12.5mg Tablet 1 bid  #180 (One [place] and [place]y) tablet(s) Refills: 1             Patient Recommendations:        For  Health checkup:     Limit dietary intake of fat (especially saturated fat) and cholesterol.  Eat a variety of foods, including plenty of fruits, vegetables, and grain containg fiber, limit fat intake to 30% of total calories. Balance caloric intake with energy expended. Maintaining regular physical activity is advised to help prevent heart disease, hypertension, diabetes, and obesity. Always use shoulder/lap restraints when driving or riding in a vehicle, even those  equipped with air bags.              CHARGE CAPTURE           **Please note: ICD descriptions below are intended for billing purposes only and may not represent clinical diagnoses**        Primary Diagnosis:         V70.0 Health checkup            Z00.01    Encounter for general adult medical exam w abnormal findings              Orders:             Annual wellness visit, includes a PPPS, subsequent visit  (In-House)           V79.0 Screening for depression            Z13.89    Encounter for screening for other disorder              Orders:             Depression screen negative  (In-House)           250.00 Type 2 diabetes            E11.9    Type 2 diabetes mellitus without complications    696.1 Psoriasis            L40.9    Psoriasis, unspecified    272.4 Hyperlipidemia            E78.2    Mixed hyperlipidemia    780.93 Memory loss NOS            R41.89    Other symptoms and signs involving cognitive functions and awareness    401.1 Hypertension            I10    Essential (primary) hypertension        ADDENDUMS:      ____________________________________    Date: 03/20/2018 09:34 AM    Author: Maria E Sommer         Visit Note Faxed to:        User Entered Recipient; Number [phone]     Health Summary Faxed to:        User Entered Recipient; Number [phone]            Addendum: 04/06/2018 03:37 PM - Sushila Rivero        Please add code [id number] Passport PAF form (In-house)

## 2021-12-24 NOTE — PROGRESS NOTES
Bedside and Verbal shift change report given to Bridgett Mcarthur RN (oncoming nurse) by Yamile Dai RN (offgoing nurse). Report included the following information SBAR, Kardex, ED Summary, OR Summary, Procedure Summary, Intake/Output, MAR, Accordion and Recent Results.

## 2021-12-24 NOTE — PROGRESS NOTES
Gynecology Progress Note    Patient doing well post-op day 10 from Procedure(s):  DAVINCI ASSISTED LAPROSCOPIC SUPRACERVICAL HYSTERECTOMY,WITH  BILATERAL SALPINGO-OOPHORECTOMY, EXTENSIVE LYSIS OF ADHESIONS, UTERUS GREATER THAN 250 GRAMS without significant complaints. Pain controlled on current medication. Voiding without difficulty. Patient is passing flatus. Vitals:  Blood pressure (!) 147/83, pulse 100, temperature 98.7 °F (37.1 °C), resp. rate 18, height 5' (1.524 m), weight 127 kg (280 lb), SpO2 96 %. Temp (24hrs), Av.5 °F (36.9 °C), Min:98.4 °F (36.9 °C), Max:98.7 °F (37.1 °C)        Exam:  Patient without distress. Abdomen soft,  nontender. Incision dry and clean without erythema. Lower extremities are negative for swelling, cords, or tenderness. Lab/Data Review:  BMP: No results found for: NA, K, CL, CO2, AGAP, GLU, BUN, CREA, GFRAA, GFRNA  CMP: No results found for: NA, K, CL, CO2, AGAP, GLU, BUN, CREA, GFRAA, GFRNA, CA, MG, PHOS, ALB, TBIL, TP, ALB, GLOB, AGRAT, ALT  CBC: No results found for: WBC, HGB, HGBEXT, HCT, HCTEXT, PLT, PLTEXT, HGBEXT, HCTEXT, PLTEXT    Assessment and Plan:  Patient appears to be having uncomplicated post Procedure(s):  DAVINCI ASSISTED LAPROSCOPIC SUPRACERVICAL HYSTERECTOMY,WITH  BILATERAL SALPINGO-OOPHORECTOMY, EXTENSIVE LYSIS OF ADHESIONS, UTERUS GREATER THAN 250 GRAMS course. Continue routine post-op care. 1.  Pt is eating well  2. She denies n/v  3. Vitals stable  4. Will discharge home  5. Continue antibiotics will give po ciprofloxacin  6. May d/c all fluids  7. Follow up in office in one week  8.   Pt desires Covid prior to discharge

## 2021-12-29 NOTE — PROGRESS NOTES
Physician Progress Note      Brad Palma  CSN #:                  [id number]  :                       1970  ADMIT DATE:       12/15/2021 6:55 AM  DISCH DATE:        2021 10:50 AM  RESPONDING  PROVIDER #:        Ace Estrada MD          QUERY TEXT:    Patient admitted with BMI 54.68. If possible, please document in progress notes and discharge summary if you are evaluating and /or treating any of the following: The medical record reflects the following:  Risk Factors: obese    Clinical Indicators:    Anthropometric Measures:  ? Height:  5' (152.4 cm)  ? Current Body Wt:  127 kg (279 lb 15.8 oz)  ? Ideal Body Wt:  100:  280 %  ? Adjusted Body Weight:   ; Weight Adjustment for: No adjustment  ? Adjusted BMI:  ? BMI Categories:  Obese class 3 (BMI 40.0 or greater)    Treatment: RD consult; begin PPN if unable to advance diet. Start with AA 5% D10 @ 60 ml/hr and increase to 83 ml/hr. Add 20% 500 ml Lipids x3/week    Thank you,  Rush Savage RN, BSN, CCDS, [place], [place]  Clinical Documentation  [phone] or [phone]  Options provided:  -- Obesity  -- Morbid obesity  -- Severe obesity  -- Overweight  -- BMI of 54.68 is not clinically significant  -- Other - I will add my own diagnosis  -- Disagree - Not applicable / Not valid  -- Disagree - Clinically unable to determine / Unknown  -- Refer to Clinical Documentation Reviewer    PROVIDER RESPONSE TEXT:    Provider disagreed with this query.     Query created by: Aisha Spatz on 2021 8:11 AM      Electronically signed by:  Ace Estrada MD 2021 5:57 PM

## 2022-03-18 PROBLEM — D25.9 FIBROID UTERUS: Status: ACTIVE | Noted: 2021-12-15

## 2022-03-19 PROBLEM — E66.01 OBESITY, MORBID (HCC): Status: ACTIVE | Noted: 2018-05-24

## 2022-06-06 ENCOUNTER — ANESTHESIA EVENT (OUTPATIENT)
Dept: ENDOSCOPY | Age: 52
End: 2022-06-06
Payer: COMMERCIAL

## 2022-06-06 ENCOUNTER — ANESTHESIA (OUTPATIENT)
Dept: ENDOSCOPY | Age: 52
End: 2022-06-06
Payer: COMMERCIAL

## 2022-06-06 ENCOUNTER — HOSPITAL ENCOUNTER (OUTPATIENT)
Age: 52
Setting detail: OUTPATIENT SURGERY
Discharge: HOME | End: 2022-06-06
Attending: INTERNAL MEDICINE | Admitting: INTERNAL MEDICINE
Payer: COMMERCIAL

## 2022-06-06 VITALS
OXYGEN SATURATION: 98 % | DIASTOLIC BLOOD PRESSURE: 79 MMHG | WEIGHT: 260 LBS | RESPIRATION RATE: 15 BRPM | HEART RATE: 62 BPM | BODY MASS INDEX: 51.04 KG/M2 | HEIGHT: 60 IN | SYSTOLIC BLOOD PRESSURE: 110 MMHG | TEMPERATURE: 98.2 F

## 2022-06-06 PROCEDURE — 77030009426 HC FCPS BIOP ENDOSC BSC -B: Performed by: INTERNAL MEDICINE

## 2022-06-06 PROCEDURE — 74011250636 HC RX REV CODE- 250/636: Performed by: NURSE ANESTHETIST, CERTIFIED REGISTERED

## 2022-06-06 PROCEDURE — 88305 TISSUE EXAM BY PATHOLOGIST: CPT

## 2022-06-06 PROCEDURE — 76040000019: Performed by: INTERNAL MEDICINE

## 2022-06-06 PROCEDURE — 74011000250 HC RX REV CODE- 250: Performed by: NURSE ANESTHETIST, CERTIFIED REGISTERED

## 2022-06-06 PROCEDURE — 2709999900 HC NON-CHARGEABLE SUPPLY: Performed by: INTERNAL MEDICINE

## 2022-06-06 PROCEDURE — 76060000031 HC ANESTHESIA FIRST 0.5 HR: Performed by: INTERNAL MEDICINE

## 2022-06-06 PROCEDURE — 74011250637 HC RX REV CODE- 250/637: Performed by: INTERNAL MEDICINE

## 2022-06-06 RX ORDER — ATROPINE SULFATE 0.1 MG/ML
0.5 INJECTION INTRAVENOUS
Status: DISCONTINUED | OUTPATIENT
Start: 2022-06-06 | End: 2022-06-06 | Stop reason: HOSPADM

## 2022-06-06 RX ORDER — SODIUM CHLORIDE 9 MG/ML
INJECTION, SOLUTION INTRAVENOUS
Status: DISCONTINUED | OUTPATIENT
Start: 2022-06-06 | End: 2022-06-06 | Stop reason: HOSPADM

## 2022-06-06 RX ORDER — ESTRADIOL 0.5 MG/1
0.5 TABLET ORAL DAILY
COMMUNITY

## 2022-06-06 RX ORDER — SODIUM CHLORIDE 0.9 % (FLUSH) 0.9 %
5-40 SYRINGE (ML) INJECTION EVERY 8 HOURS
Status: DISCONTINUED | OUTPATIENT
Start: 2022-06-06 | End: 2022-06-06 | Stop reason: HOSPADM

## 2022-06-06 RX ORDER — NALOXONE HYDROCHLORIDE 0.4 MG/ML
0.4 INJECTION, SOLUTION INTRAMUSCULAR; INTRAVENOUS; SUBCUTANEOUS
Status: DISCONTINUED | OUTPATIENT
Start: 2022-06-06 | End: 2022-06-06 | Stop reason: HOSPADM

## 2022-06-06 RX ORDER — FLUMAZENIL 0.1 MG/ML
0.2 INJECTION INTRAVENOUS
Status: DISCONTINUED | OUTPATIENT
Start: 2022-06-06 | End: 2022-06-06 | Stop reason: HOSPADM

## 2022-06-06 RX ORDER — MIDAZOLAM HYDROCHLORIDE 1 MG/ML
.25-5 INJECTION, SOLUTION INTRAMUSCULAR; INTRAVENOUS
Status: DISCONTINUED | OUTPATIENT
Start: 2022-06-06 | End: 2022-06-06 | Stop reason: HOSPADM

## 2022-06-06 RX ORDER — EPINEPHRINE 0.1 MG/ML
1 INJECTION INTRACARDIAC; INTRAVENOUS
Status: DISCONTINUED | OUTPATIENT
Start: 2022-06-06 | End: 2022-06-06 | Stop reason: HOSPADM

## 2022-06-06 RX ORDER — SODIUM CHLORIDE 0.9 % (FLUSH) 0.9 %
5-40 SYRINGE (ML) INJECTION AS NEEDED
Status: DISCONTINUED | OUTPATIENT
Start: 2022-06-06 | End: 2022-06-06 | Stop reason: HOSPADM

## 2022-06-06 RX ORDER — DEXTROMETHORPHAN/PSEUDOEPHED 2.5-7.5/.8
1.2 DROPS ORAL
Status: DISCONTINUED | OUTPATIENT
Start: 2022-06-06 | End: 2022-06-06 | Stop reason: HOSPADM

## 2022-06-06 RX ORDER — PROPOFOL 10 MG/ML
INJECTION, EMULSION INTRAVENOUS AS NEEDED
Status: DISCONTINUED | OUTPATIENT
Start: 2022-06-06 | End: 2022-06-06 | Stop reason: HOSPADM

## 2022-06-06 RX ORDER — SODIUM CHLORIDE 9 MG/ML
50 INJECTION, SOLUTION INTRAVENOUS CONTINUOUS
Status: DISCONTINUED | OUTPATIENT
Start: 2022-06-06 | End: 2022-06-06 | Stop reason: HOSPADM

## 2022-06-06 RX ORDER — LIDOCAINE HYDROCHLORIDE 20 MG/ML
INJECTION, SOLUTION EPIDURAL; INFILTRATION; INTRACAUDAL; PERINEURAL AS NEEDED
Status: DISCONTINUED | OUTPATIENT
Start: 2022-06-06 | End: 2022-06-06 | Stop reason: HOSPADM

## 2022-06-06 RX ORDER — FENTANYL CITRATE 50 UG/ML
25-200 INJECTION, SOLUTION INTRAMUSCULAR; INTRAVENOUS
Status: DISCONTINUED | OUTPATIENT
Start: 2022-06-06 | End: 2022-06-06 | Stop reason: HOSPADM

## 2022-06-06 RX ADMIN — PROPOFOL 40 MG: 10 INJECTION, EMULSION INTRAVENOUS at 09:19

## 2022-06-06 RX ADMIN — PROPOFOL 30 MG: 10 INJECTION, EMULSION INTRAVENOUS at 09:21

## 2022-06-06 RX ADMIN — PROPOFOL 30 MG: 10 INJECTION, EMULSION INTRAVENOUS at 09:13

## 2022-06-06 RX ADMIN — PROPOFOL 70 MG: 10 INJECTION, EMULSION INTRAVENOUS at 09:11

## 2022-06-06 RX ADMIN — PROPOFOL 30 MG: 10 INJECTION, EMULSION INTRAVENOUS at 09:14

## 2022-06-06 RX ADMIN — LIDOCAINE HYDROCHLORIDE 40 MG: 20 INJECTION, SOLUTION EPIDURAL; INFILTRATION; INTRACAUDAL; PERINEURAL at 09:11

## 2022-06-06 RX ADMIN — SODIUM CHLORIDE: 900 INJECTION, SOLUTION INTRAVENOUS at 08:45

## 2022-06-06 NOTE — ANESTHESIA POSTPROCEDURE EVALUATION
Post-Anesthesia Evaluation and Assessment    Patient: Quintin Shock MRN: [id number]  SSN: [id number]    YOB: 1970  Age: 45 y.o. Sex: female      I have evaluated the patient and they are stable and ready for discharge from the PACU. Cardiovascular Function/Vital Signs  Visit Vitals  /79   Pulse 62   Temp 36.8 °C (98.2 °F)   Resp 15   Ht 5' (1.524 m)   Wt 117.9 kg (260 lb)   SpO2 98%   Breastfeeding No   BMI 50.78 kg/m²       Patient is status post MAC anesthesia for Procedure(s):  COLONOSCOPY  ENDOSCOPIC POLYPECTOMY. Nausea/Vomiting: None    Postoperative hydration reviewed and adequate. Pain:  Pain Scale 1: Numeric (0 - 10) (06/06/22 0940)  Pain Intensity 1: 0 (06/06/22 0940)   Managed    Neurological Status: At baseline    Mental Status, Level of Consciousness: Alert and  oriented to person, place, and time    Pulmonary Status:   O2 Device: None (Room air) (06/06/22 0940)   Adequate oxygenation and airway patent    Complications related to anesthesia: None    Post-anesthesia assessment completed. No concerns    Signed By: Herbert Balbuena MD     June 6, 2022              Procedure(s):  COLONOSCOPY  ENDOSCOPIC POLYPECTOMY. MAC    <BSHSIANPOST>    INITIAL Post-op Vital signs:   Vitals Value Taken Time   /79 06/06/22 0940   Temp 36.8 °C (98.2 °F) 06/06/22 0930   Pulse 60 06/06/22 0945   Resp 14 06/06/22 0945   SpO2 97 % 06/06/22 0945   Vitals shown include unvalidated device data.

## 2022-06-06 NOTE — ANESTHESIA PREPROCEDURE EVALUATION
Relevant Problems   No relevant active problems       Anesthetic History   No history of anesthetic complications            Review of Systems / Medical History  Patient summary reviewed, nursing notes reviewed and pertinent labs reviewed    Pulmonary  Within defined limits                 Neuro/Psych   Within defined limits           Cardiovascular  Within defined limits                     GI/Hepatic/Renal  Within defined limits              Endo/Other        Morbid obesity and arthritis     Other Findings              Physical Exam    Airway  Mallampati: II  TM Distance: > 6 cm  Neck ROM: normal range of motion   Mouth opening: Normal     Cardiovascular  Regular rate and rhythm,  S1 and S2 normal,  no murmur, click, rub, or gallop             Dental  No notable dental hx       Pulmonary  Breath sounds clear to auscultation               Abdominal  GI exam deferred       Other Findings            Anesthetic Plan    ASA: 3  Anesthesia type: MAC            Anesthetic plan and risks discussed with: Patient

## 2022-06-06 NOTE — DISCHARGE INSTRUCTIONS
[address], [address] [facility]    COLON DISCHARGE INSTRUCTIONS    Jennifer Faulkner  [id number]  1970    Discomfort:  Redness at IV site- apply warm compress to area; if redness or soreness persist- contact your physician  There may be a slight amount of blood passed from the rectum  Gaseous discomfort- walking, belching will help relieve any discomfort  You may not operate a vehicle for 12 hours  You may not engage in an occupation involving machinery or appliances for rest of today  You may not drink alcoholic beverages for at least 12 hours  Avoid making any critical decisions for at least 24 hour  DIET:  You may resume your regular diet - however -  remember your colon is empty and a heavy meal will produce gas. Avoid these foods:  vegetables, fried / greasy foods, carbonated drinks     ACTIVITY:  You may  resume your normal daily activities it is recommended that you spend the remainder of the day resting -  avoid any strenuous activity. CALL M.D. ANY SIGN OF:   Increasing pain, nausea, vomiting  Abdominal distension (swelling)  New increased bleeding (oral or rectal)  Fever (chills)  Pain in chest area  Bloody discharge from nose or mouth  Shortness of breath      Follow-up Instructions:   Call Dr. Immanuel Ballesteros for any questions or problems at [address] [place] [address]:   Your colonoscopy showed one small polyp removed, rest of exam was normal.  Telephone # [phone]      Signed By: Immanuel Ballesteros MD     6/6/2022  9:28 AM       DISCHARGE SUMMARY from Nurse    The following personal items collected during your admission are returned to you:   Dental Appliance: Dental Appliances: None  Vision: Visual Aid: Glasses,At bedside  Hearing Aid:    Jewelry:    Clothing:    Other Valuables:    Valuables sent to safe:        Learning About Coronavirus (COVID-19)  Coronavirus [phone]): Overview  What is coronavirus (LKKRY-60)?   The coronavirus disease (COVID-19) is caused by a virus. It is an illness that was first found in [place], [place], in December 2019. It has since spread worldwide. The virus can cause fever, cough, and trouble breathing. In severe cases, it can cause pneumonia and make it hard to breathe without help. It can cause death. Coronaviruses are a large group of viruses. They cause the common cold. They also cause more serious illnesses like Middle East respiratory syndrome (MERS) and severe acute respiratory syndrome (SARS). COVID-19 is caused by a novel coronavirus. That means it's a new type that has not been seen in people before. This virus spreads person-to-person through droplets from coughing and sneezing. It can also spread when you are close to someone who is infected. And it can spread when you touch something that has the virus on it, such as a doorknob or a tabletop. What can you do to protect yourself from coronavirus (COVID-19)? The best way to protect yourself from getting sick is to:  · Avoid areas where there is an outbreak. · Avoid contact with people who may be infected. · Wash your hands often with soap or alcohol-based hand sanitizers. · Avoid crowds and try to stay at least 6 feet away from other people. · Wash your hands often, especially after you cough or sneeze. Use soap and water, and scrub for at least 20 seconds. If soap and water aren't available, use an alcohol-based hand . · Avoid touching your mouth, nose, and eyes. What can you do to avoid spreading the virus to others? To help avoid spreading the virus to others:  · Cover your mouth with a tissue when you cough or sneeze. Then throw the tissue in the trash. · Use a disinfectant to clean things that you touch often. · Stay home if you are sick or have been exposed to the virus. Don't go to school, work, or public areas. And don't use public transportation. · If you are sick:  ? Leave your home only if you need to get medical care. But call the doctor's office first so they know you're coming. And wear a face mask, if you have one.  ? If you have a face mask, wear it whenever you're around other people. It can help stop the spread of the virus when you cough or sneeze. ? Clean and disinfect your home every day. Use household  and disinfectant wipes or sprays. Take special care to clean things that you grab with your hands. These include doorknobs, remote controls, phones, and handles on your refrigerator and microwave. And don't forget countertops, tabletops, bathrooms, and computer keyboards. When to call for help  Call 911 anytime you think you may need emergency care. For example, call if:  · You have severe trouble breathing. (You can't talk at all.)  · You have constant chest pain or pressure. · You are severely dizzy or lightheaded. · You are confused or can't think clearly. · Your face and lips have a blue color. · You pass out (lose consciousness) or are very hard to wake up. Call your doctor now if you develop symptoms such as:  · Shortness of breath. · Fever. · Cough. If you need to get care, call ahead to the doctor's office for instructions before you go. Make sure you wear a face mask, if you have one, to prevent exposing other people to the virus. Where can you get the latest information? The following health organizations are tracking and studying this virus. Their websites contain the most up-to-date information. Paulita Dakin also learn what to do if you think you may have been exposed to the virus. · U.S. Centers for Disease Control and Prevention (CDC): The CDC provides updated news about the disease and travel advice. The website also tells you how to prevent the spread of infection. www.cdc.gov  · World Health Organization [facility]): WHO offers information about the virus outbreaks.  WHO also has travel advice. www.who.int  Current as of: April 1, 2020               Content Version: 12.4  © 3163-5708 Health[employer], Incorporated. Care instructions adapted under license by your healthcare professional. If you have questions about a medical condition or this instruction, always ask your healthcare professional. [address] any warranty or liability for your use of this information.

## 2022-06-06 NOTE — PROCEDURES
[address], [address] [place] [address]      Colonoscopy Operative Report    Fantasma Layne  [id number]  1970      Procedure Type:   Colonoscopy with polypectomy (hot biopsy)     Indications: screening for colon cancer    Pre-operative Diagnosis: see indication above    Post-operative Diagnosis:  See findings below    :  Michelle Ricks MD    Surgical Assistant: Endoscopy Technician-1: [place] JOSE  Endoscopy RN-1: Regina Canas RN    Implants:  None    Referring Provider: Eli Apgar, NP      Sedation:  MAC anesthesia Propofol      Procedure Details:  After informed consent was obtained with all risks and benefits of procedure explained and preoperative exam completed, the patient was taken to the endoscopy suite and placed in the left lateral decubitus position. Upon sequential sedation as per above, a digital rectal exam was performed demonstrating internal hemorrhoids. The Olympus videocolonoscope  was inserted in the rectum and carefully advanced to the cecum, which was identified by the ileocecal valve and appendiceal orifice. The cecum was identified by the ileocecal valve and appendiceal orifice. The quality of preparation was good. The colonoscope was slowly withdrawn with careful evaluation between folds. Retroflexion in the rectum was completed . Findings:   Rectum: 1 cm sessile polyp, removed by hot biopsies    Sigmoid: normal  Descending Colon: normal  Transverse Colon: normal  Ascending Colon: normal  Cecum: normal    Specimen Removed:  as above    Complications: None. EBL:  None. Impression:    see findings    Recommendations: --Await pathology.       Recommendation for next colonscopy in 3 versus 5  years    Signed By: Michelle Ricks MD     2022  9:26 AM

## 2022-06-06 NOTE — PROGRESS NOTES
[facility]  1970  [id number]    Situation:  Verbal report received from: angela Simpson RN  Procedure: Procedure(s):  COLONOSCOPY  ENDOSCOPIC POLYPECTOMY    Background:    Preoperative diagnosis: SCREENING  Postoperative diagnosis: Rectal Polyp    :  Dr. Krysta Miramontes  Assistant(s): Endoscopy Technician-1: Dick Cerna  Endoscopy RN-1: Deandre Ridley RN    Specimens:   ID Type Source Tests Collected by Time Destination   1 : Rectum Polyp Preservative   Kiana Castillo MD 6/6/2022 [address] Pathology     H. Pylori  no    Assessment:    Anesthesia gave intra-procedure sedation and medications, see anesthesia flow sheet yes    Intravenous fluids: NS@ KVO     Vital signs stable     Abdominal assessment: round and soft     Recommendation:  Discharge patient per MD order.   Family : mother in car  Permission to share finding with family or friend yes

## 2022-06-06 NOTE — PROGRESS NOTES

## 2022-06-06 NOTE — H&P
[address]      History and Physical       NAME:  Shirley Saldaña   :   1970   MRN:   [id number]             History of Present Illness:  Patient is a 45 y.o. who is seen for screening colonoscopy . PMH:  Past Medical History:   Diagnosis Date    Arthritis     Endometriosis     Knee injury     RIGHT    Tendonitis     LEFT ANKLE       PSH:  Past Surgical History:   Procedure Laterality Date    HX ADENOIDECTOMY      HX HEENT      BMT    HX HYSTERECTOMY      HX NEPHROSTOMY Left     HX OTHER SURGICAL      RIGHT ANKLE       Allergies: Allergies   Allergen Reactions    Ciprofloxacin Rash       Home Medications:  Prior to Admission Medications   Prescriptions Last Dose Informant Patient Reported? Taking? B-complex with vitamin C (VITAMIN B COMPLEX-C PO) 2022  Yes No   Sig: Take 1 Tablet by mouth daily. B.infantis-B.ani-B.long-B.bifi (Probiotic 4X) 10-15 mg TbEC 2022  Yes No   Sig: Take 1 Tablet by mouth daily. elderberry fruit (ELDERBERRY PO) 2022  Yes No   Sig: Take 1 Tablet by mouth daily. ergocalciferol (Vitamin D2) 1,250 mcg (50,000 unit) capsule 2022  Yes No   Sig: Take 50,000 Units by mouth every seven (7) days. Saturday   estradioL (ESTRACE) 0.5 mg tablet 2022  Yes Yes   Sig: Take 0.5 mg by mouth daily. multivitamin (ONE A DAY) tablet 2022  Yes No   Sig: Take 1 Tablet by mouth daily. omega 3-DHA-EPA-fish oil (Fish OiL) 1,000 mg (120 mg-180 mg) capsule 2022 at Unknown time  Yes Yes   Sig: Take 1 Capsule by mouth daily.       Facility-Administered Medications: None       Hospital Medications:  Current Facility-Administered Medications   Medication Dose Route Frequency    0.9% sodium chloride infusion  50 mL/hr IntraVENous CONTINUOUS    sodium chloride (NS) flush 5-40 mL  5-40 mL IntraVENous Q8H    sodium chloride (NS) flush 5-40 mL  5-40 mL IntraVENous PRN    midazolam (VERSED) injection 0.25-5 mg  0.25-5 mg IntraVENous Multiple    fentaNYL citrate (PF) injection  mcg   mcg IntraVENous Multiple    naloxone (NARCAN) injection 0.4 mg  0.4 mg IntraVENous Multiple    flumazeniL (ROMAZICON) 0.1 mg/mL injection 0.2 mg  0.2 mg IntraVENous Multiple    simethicone (MYLICON) 64DX/9.7ZL oral drops 80 mg  1.2 mL Oral Multiple    atropine injection 0.5 mg  0.5 mg IntraVENous ONCE PRN    EPINEPHrine (ADRENALIN) 0.1 mg/mL syringe 1 mg  1 mg Endoscopically ONCE PRN     Facility-Administered Medications Ordered in Other Encounters   Medication Dose Route Frequency    0.9% sodium chloride infusion   IntraVENous CONTINUOUS       Social History:  Social History     Tobacco Use    Smoking status: Never Smoker    Smokeless tobacco: Never Used   Substance Use Topics    Alcohol use: Yes     Alcohol/week: 1.0 standard drink     Types: 1 Glasses of wine per week       Family History:  Family History   Problem Relation Age of Onset    No Known Problems Mother     Cancer Father 62        agent orange    Alzheimer's Disease Paternal Grandmother     Other Sister         FIBROMYALGIA, TRIGEMINAL NEUROLOGIA    Anesth Problems Neg Hx          The patient was counseled at length about the risks of claudia Covid-19 in the cori-operative and post-operative states including the recovery window of their procedure. The patient was made aware that claudia Covid-19 after a surgical procedure may worsen their prognosis for recovering from the virus and lend to a higher morbidity and or mortality risk. The patient was given the options of postponing their procedure. All of the risks, benefits, and alternatives were discussed. The patient does  wish to proceed with the procedure.     Review of Systems:      Constitutional: negative fever, negative chills, negative weight loss  Eyes:   negative visual changes  ENT:   negative sore throat, tongue or lip swelling  Respiratory:  negative cough, negative dyspnea  Cards:  negative for chest pain, palpitations, lower extremity edema  GI:   See HPI  :  negative for frequency, dysuria  Integument:  negative for rash and pruritus  Heme:  negative for easy bruising and gum/nose bleeding  Musculoskel: negative for myalgias,  back pain and muscle weakness  Neuro: negative for headaches, dizziness, vertigo  Psych:  negative for feelings of anxiety, depression       Objective:     Patient Vitals for the past 8 hrs:   Height Weight   06/06/22 0829 5' (1.524 m) 117.9 kg (260 lb)     No intake/output data recorded. No intake/output data recorded. EXAM:     NEURO-a&o   HEENT-wnl   LUNGS-clear    COR-regular rate and rhythym     ABD-soft , no tenderness, no rebound, good bs     EXT-no edema     Data Review     No results for input(s): WBC, HGB, HCT, PLT, HGBEXT, HCTEXT, PLTEXT in the last 72 hours. No results for input(s): NA, K, CL, CO2, BUN, CREA, GLU, PHOS, CA in the last 72 hours. No results for input(s): AP, TBIL, TP, ALB, GLOB, GGT, AML, LPSE in the last 72 hours. No lab exists for component: SGOT, GPT, AMYP, HLPSE  No results for input(s): INR, PTP, APTT, INREXT in the last 72 hours.        Assessment:     · Screening colonoscopy      Patient Active Problem List   Diagnosis Code    Ob[facility], morbid ([facility] E66.01    Fibroid uterus D25.9         Plan:   ·   · Endoscopic procedure with sedation     Signed By: Yasmine Deutsch MD     6/6/2022  9:07 AM

## 2022-11-14 NOTE — PROGRESS NOTES
1130  Pt takes 0.25 patch of Nitroglycerin at home for ankle tendonitis. Will consult MD for decreased dose as we are unable to cut patches in the hospital. Also provided pt with bedside commode as she is getting a blood transfusion, has a NGT, rodgers and continuous IV fluids. 5 Pt called out for leaking NGT again. NGT has been leaking at the connection site throughout the day. Replaced the connector for a tighter seal. Will continue to monitor. Name band;

## 2023-05-23 RX ORDER — ESTRADIOL 0.5 MG/1
0.5 TABLET ORAL DAILY
COMMUNITY

## 2023-05-23 RX ORDER — ERGOCALCIFEROL 1.25 MG/1
50000 CAPSULE ORAL
COMMUNITY

## 2024-03-28 ENCOUNTER — HOSPITAL ENCOUNTER (OUTPATIENT)
Facility: HOSPITAL | Age: 54
Discharge: HOME | End: 2024-03-30
Payer: COMMERCIAL

## 2024-03-28 VITALS — WEIGHT: 260 LBS | HEIGHT: 61 IN | BODY MASS INDEX: 49.09 KG/M2

## 2024-03-28 DIAGNOSIS — R01.1 CARDIAC MURMUR, UNSPECIFIED: ICD-10-CM

## 2024-03-28 PROCEDURE — 93306 TTE W/DOPPLER COMPLETE: CPT

## 2024-03-29 LAB
ECHO AO ROOT DIAM: 3.5 CM
ECHO AO ROOT INDEX: 1.67 CM/M2
ECHO AV AREA PEAK VELOCITY: 2.6 CM2
ECHO AV AREA/BSA PEAK VELOCITY: 1.2 CM2/M2
ECHO AV PEAK GRADIENT: 12 MMHG
ECHO AV PEAK VELOCITY: 1.7 M/S
ECHO AV VELOCITY RATIO: 0.88
ECHO BSA: 2.25 M2
ECHO LA DIAMETER INDEX: 1.76 CM/M2
ECHO LA DIAMETER: 3.7 CM
ECHO LA TO AORTIC ROOT RATIO: 1.06
ECHO LA VOL A-L A2C: 63 ML (ref 22–52)
ECHO LA VOL A-L A4C: 70 ML (ref 22–52)
ECHO LA VOL MOD A2C: 60 ML (ref 22–52)
ECHO LA VOL MOD A4C: 67 ML (ref 22–52)
ECHO LA VOLUME AREA LENGTH: 70 ML
ECHO LA VOLUME INDEX A-L A2C: 30 ML/M2 (ref 16–34)
ECHO LA VOLUME INDEX A-L A4C: 33 ML/M2 (ref 16–34)
ECHO LA VOLUME INDEX AREA LENGTH: 33 ML/M2 (ref 16–34)
ECHO LA VOLUME INDEX MOD A2C: 29 ML/M2 (ref 16–34)
ECHO LA VOLUME INDEX MOD A4C: 32 ML/M2 (ref 16–34)
ECHO LV E' LATERAL VELOCITY: 9 CM/S
ECHO LV E' SEPTAL VELOCITY: 5 CM/S
ECHO LV FRACTIONAL SHORTENING: 34 % (ref 28–44)
ECHO LV INTERNAL DIMENSION DIASTOLE INDEX: 2.1 CM/M2
ECHO LV INTERNAL DIMENSION DIASTOLIC: 4.4 CM (ref 3.9–5.3)
ECHO LV INTERNAL DIMENSION SYSTOLIC INDEX: 1.38 CM/M2
ECHO LV INTERNAL DIMENSION SYSTOLIC: 2.9 CM
ECHO LV IVSD: 1.1 CM (ref 0.6–0.9)
ECHO LV MASS 2D: 168.9 G (ref 67–162)
ECHO LV MASS INDEX 2D: 80.4 G/M2 (ref 43–95)
ECHO LV POSTERIOR WALL DIASTOLIC: 1.1 CM (ref 0.6–0.9)
ECHO LV RELATIVE WALL THICKNESS RATIO: 0.5
ECHO LVOT AREA: 3.1 CM2
ECHO LVOT DIAM: 2 CM
ECHO LVOT PEAK GRADIENT: 9 MMHG
ECHO LVOT PEAK VELOCITY: 1.5 M/S
ECHO MV A VELOCITY: 0.86 M/S
ECHO MV E DECELERATION TIME (DT): 186.2 MS
ECHO MV E VELOCITY: 0.86 M/S
ECHO MV E/A RATIO: 1
ECHO MV E/E' LATERAL: 9.56
ECHO MV E/E' RATIO (AVERAGED): 13.38
ECHO RA VOLUME: 56 ML
ECHO RA VOLUME: 58 ML
ECHO RV TAPSE: 2.1 CM (ref 1.7–?)
ECHO TV REGURGITANT MAX VELOCITY: 2.08 M/S
ECHO TV REGURGITANT PEAK GRADIENT: 18 MMHG

## 2024-04-05 ENCOUNTER — HOSPITAL ENCOUNTER (OUTPATIENT)
Facility: HOSPITAL | Age: 54
Discharge: HOME | End: 2024-04-05
Payer: COMMERCIAL

## 2024-04-05 VITALS — HEIGHT: 61 IN | WEIGHT: 259.92 LBS | BODY MASS INDEX: 49.07 KG/M2

## 2024-04-05 DIAGNOSIS — Z12.31 VISIT FOR SCREENING MAMMOGRAM: ICD-10-CM

## 2024-04-05 PROCEDURE — 77067 SCR MAMMO BI INCL CAD: CPT

## 2024-06-19 ENCOUNTER — OFFICE VISIT (OUTPATIENT)
Age: 54
End: 2024-06-19
Payer: COMMERCIAL

## 2024-06-19 DIAGNOSIS — M17.0 BILATERAL PRIMARY OSTEOARTHRITIS OF KNEE: Primary | ICD-10-CM

## 2024-06-19 PROCEDURE — 99204 OFFICE O/P NEW MOD 45 MIN: CPT | Performed by: ORTHOPAEDIC SURGERY

## 2024-06-19 ASSESSMENT — PATIENT HEALTH QUESTIONNAIRE - PHQ9
SUM OF ALL RESPONSES TO PHQ QUESTIONS 1-9: 0
SUM OF ALL RESPONSES TO PHQ QUESTIONS 1-9: 0
1. LITTLE INTEREST OR PLEASURE IN DOING THINGS: NOT AT ALL
SUM OF ALL RESPONSES TO PHQ QUESTIONS 1-9: 0
SUM OF ALL RESPONSES TO PHQ QUESTIONS 1-9: 0
2. FEELING DOWN, DEPRESSED OR HOPELESS: NOT AT ALL
SUM OF ALL RESPONSES TO PHQ9 QUESTIONS 1 & 2: 0

## 2024-06-19 NOTE — PROGRESS NOTES
Identified pt with two pt identifiers (name and ). Reviewed chart in preparation for visit and have obtained necessary documentation.    Demi Little is a 53 y.o. female Pain (Right knee)  .    There were no vitals filed for this visit.       1. Have you been to the ER, urgent care clinic since your last visit?  Hospitalized since your last visit?  no     2. Have you seen or consulted any other health care providers outside of the [facility] since your last visit?  Include any pap smears or colon screening.  no

## 2024-06-19 NOTE — PROGRESS NOTES
6/19/2024    Chief Complaint: Bilateral knee pain    Assessment: Osteoarthritis bilateral knee    Plan:  This patient and I did discuss the many options in treating knee osteoarthritis.  We did discuss that we could continue to seek out nonoperative modalities, such as: NSAIDs, oral and topical analgesics, knee injections, knee braces, physical therapy, stretching, strengthening, and weight loss strategies, activity modification, ambulatory assistive devices.  This patient and I went over a thorough discussion regarding weight and how that plays into surgical risk.  This is a modifiable risk factor, we went over the danger curve for infection after surgery if a patient's body mass index is over 40, I went into the logistics of that, we also discussed significant strategies, dietitian, exercises that do not involve the lower extremity, offloading with nonweightbearing exercises.  Once the body mass index is below 40, this can become a surgical solution if necessary and all other modalities have failed.  A significant portion of discussion was centered around this need.  The patient stated their understanding with this, and would like to proceed with nonsurgical management in the form of weight loss, I will communicate with the patient's PCP regarding medical health.    HPI: This is a 53 y.o. female who complains of bilateral knee pain. right side is worse than the left side.  Onset was gradual.  The patient has had activity dependent pain for years.    The patient has tried activity modification, physical therapy exercises, injections have failed.  The pain is in the medial knee, it is severe in intensity.  The patient feels unstable with the knee, fears falling, and has significant limitation with activities of daily living, recreation, and walks with a limp.    Past Medical History:   Diagnosis Date    Arthritis     Endometriosis     Knee injury     RIGHT    Tendonitis     LEFT ANKLE       Past Surgical History:

## 2024-10-31 ENCOUNTER — OFFICE VISIT (OUTPATIENT)
Age: 54
End: 2024-10-31
Payer: COMMERCIAL

## 2024-10-31 DIAGNOSIS — M17.0 BILATERAL PRIMARY OSTEOARTHRITIS OF KNEE: Primary | ICD-10-CM

## 2024-10-31 PROCEDURE — 99213 OFFICE O/P EST LOW 20 MIN: CPT | Performed by: ORTHOPAEDIC SURGERY

## 2024-10-31 RX ORDER — TIRZEPATIDE 7.5 MG/.5ML
INJECTION, SOLUTION SUBCUTANEOUS
COMMUNITY
Start: 2024-08-13

## 2024-10-31 RX ORDER — MULTIVITAMIN
1 TABLET ORAL DAILY
COMMUNITY

## 2024-10-31 ASSESSMENT — PATIENT HEALTH QUESTIONNAIRE - PHQ9
SUM OF ALL RESPONSES TO PHQ9 QUESTIONS 1 & 2: 0
1. LITTLE INTEREST OR PLEASURE IN DOING THINGS: NOT AT ALL
2. FEELING DOWN, DEPRESSED OR HOPELESS: NOT AT ALL
SUM OF ALL RESPONSES TO PHQ QUESTIONS 1-9: 0

## 2024-10-31 NOTE — PROGRESS NOTES
Identified pt with two pt identifiers (name and ). Reviewed chart in preparation for visit and have obtained necessary documentation.    Demi Little is a 54 y.o. female  Chief Complaint   Patient presents with    Follow-up     F/U   - 6 week f/u rt knee pain// weight check in       There were no vitals taken for this visit.    1. Have you been to the ER, urgent care clinic since your last visit?  Hospitalized since your last visit?no    2. Have you seen or consulted any other health care providers outside of the [facility] since your last visit?  Include any pap smears or colon screening. no

## 2024-10-31 NOTE — PROGRESS NOTES
10/31/2024      CC: right knee pain    HPI:      This is a 54 y.o. year old female who presents for a follow up visit.  The patient was last seen and diagnosed with right knee osteoarthritis.   The patient's treatments since the most recent visit have comprised of weight loss efforts.   The patient has had no relief of the chief complaint.        PMH:  Past Medical History:   Diagnosis Date    Arthritis     Endometriosis     Knee injury     RIGHT    Tendonitis     LEFT ANKLE       PSxHx:  Past Surgical History:   Procedure Laterality Date    ADENOIDECTOMY  1975    COLONOSCOPY N/A 6/6/2022    COLONOSCOPY performed by Daryl Helms MD at [facility] ENDOSCOPY    CT ABSCESS DRAINAGE NECK THORAX  02/09/2022    CT ABSCESS DRAINAGE NECK THORAX 2/9/2022    HEENT  1975    BMT    HYSTERECTOMY (CERVIX STATUS UNKNOWN)      NEPHROSTOMY Left 2022    OTHER SURGICAL HISTORY  2005    RIGHT ANKLE    OVARY REMOVAL         Meds:    Current Outpatient Medications:     ergocalciferol (ERGOCALCIFEROL) 1.25 MG (06527 UT) capsule, Take 1 capsule by mouth every 7 days, Disp: , Rfl:     estradiol (ESTRACE) 0.5 MG tablet, Take 1 tablet by mouth daily (Patient not taking: Reported on 6/19/2024), Disp: , Rfl:     All:  Allergies   Allergen Reactions    Ciprofloxacin Rash       Social Hx:  Social History     Socioeconomic History    Marital status: Single   Tobacco Use    Smoking status: Never    Smokeless tobacco: Never   Substance and Sexual Activity    Alcohol use: Yes     Alcohol/week: 1.0 standard drink of alcohol    Drug use: No     Social Determinants of Health     Food Insecurity: No Food Insecurity (2/10/2022)    Received from [facility]    Hunger Vital Sign     Worried About Running Out of Food in the Last Year: Never true     Ran Out of Food in the Last Year: Never true   Transportation Needs: No Transportation Needs (2/10/2022)    Received from [facility]    PRABannerE - Transportation     Lack of Transportation (Medical): No     Lack of

## 2025-03-20 ENCOUNTER — TRANSCRIBE ORDERS (OUTPATIENT)
Facility: HOSPITAL | Age: 55
End: 2025-03-20

## 2025-03-20 DIAGNOSIS — Z12.31 OTHER SCREENING MAMMOGRAM: Primary | ICD-10-CM

## 2025-04-30 ENCOUNTER — HOSPITAL ENCOUNTER (OUTPATIENT)
Facility: HOSPITAL | Age: 55
Discharge: HOME | End: 2025-05-03
Payer: COMMERCIAL

## 2025-04-30 DIAGNOSIS — Z12.31 OTHER SCREENING MAMMOGRAM: ICD-10-CM

## 2025-04-30 PROCEDURE — 77063 BREAST TOMOSYNTHESIS BI: CPT

## (undated) DEVICE — REM POLYHESIVE ADULT PATIENT RETURN ELECTRODE: Brand: VALLEYLAB

## (undated) DEVICE — SUTURE MCRYL SZ 4-0 L27IN ABSRB UD L19MM PS-2 1/2 CIR PRIM Y426H

## (undated) DEVICE — AIRSEAL 12 MM ACCESS PORT AND PALM GRIP OBTURATOR WITH BLADELESS OPTICAL TIP, 120 MM LENGTH: Brand: AIRSEAL

## (undated) DEVICE — SEAL UNIV 5-8MM DISP BX/10 -- DA VINCI XI - SNGL USE

## (undated) DEVICE — ARM DRAPE

## (undated) DEVICE — PAD PT POS 36 IN SURGYPAD DISP

## (undated) DEVICE — ELECTRO LUBE IS A SINGLE PATIENT USE DEVICE THAT IS INTENDED TO BE USED ON ELECTROSURGICAL ELECTRODES TO REDUCE STICKING.: Brand: KEY SURGICAL ELECTRO LUBE

## (undated) DEVICE — INSUFFLATION NEEDLE TO ESTABLISH PNEUMOPERITONEUM.: Brand: INSUFFLATION NEEDLE

## (undated) DEVICE — GLOVE ORANGE PI 7 1/2   MSG9075

## (undated) DEVICE — MORCELLATOR ENDO 15MM OBT DISPOSABLEXCISE

## (undated) DEVICE — TROCAR SITE CLOSURE DEVICE: Brand: ENDO CLOSE

## (undated) DEVICE — SOLUTION IRRIG 1000ML 0.9% SOD CHL USP POUR PLAS BTL

## (undated) DEVICE — TAPE,CLOTH/SILK,CURAD,3"X10YD,LF,40/CS: Brand: CURAD

## (undated) DEVICE — HANDLE LT SNAP ON ULT DURABLE LENS FOR TRUMPF ALC DISPOSABLE

## (undated) DEVICE — POWDER HEMOSTAT GEL 3.0GR -- SURGICEL

## (undated) DEVICE — SUTURE SZ 0 27IN 5/8 CIR UR-6  TAPER PT VIOLET ABSRB VICRYL J603H

## (undated) DEVICE — APPLICATOR FLEXIBLE 360D 40CM --

## (undated) DEVICE — OBTRTR BLDELSS 8MM DISP -- DA VINCI - SNGL USE

## (undated) DEVICE — LAPAROSCOPIC TROCAR SLEEVE/SINGLE USE: Brand: KII® OPTICAL ACCESS SYSTEM

## (undated) DEVICE — COVER MPLR TIP CRV SCIS ACC DA VINCI

## (undated) DEVICE — TRI-LUMEN FILTERED TUBE SET WITH ACTIVATED CHARCOAL FILTER: Brand: AIRSEAL

## (undated) DEVICE — GYN LAPAROSCOPY - SMH: Brand: MEDLINE INDUSTRIES, INC.

## (undated) DEVICE — BLADE ASSEMB CLP HAIR FINE --

## (undated) DEVICE — SYR 10ML LUER LOK 1/5ML GRAD --

## (undated) DEVICE — VCARE DX UTERINE MANIPULATOR/INJECTOR CANNULA: Brand: VCARE DX

## (undated) DEVICE — SEALANT TISS 10 CC FIBRIN VISTASEAL

## (undated) DEVICE — Device

## (undated) DEVICE — FCPS BX HOT RJ4 2.2MMX240CM -- RADIAL JAW 4 BX/40